# Patient Record
Sex: FEMALE | Race: WHITE | Employment: UNEMPLOYED | URBAN - METROPOLITAN AREA
[De-identification: names, ages, dates, MRNs, and addresses within clinical notes are randomized per-mention and may not be internally consistent; named-entity substitution may affect disease eponyms.]

---

## 2017-05-16 ENCOUNTER — OFFICE VISIT (OUTPATIENT)
Dept: INTERNAL MEDICINE CLINIC | Age: 22
End: 2017-05-16

## 2017-05-16 VITALS
TEMPERATURE: 98.9 F | RESPIRATION RATE: 16 BRPM | OXYGEN SATURATION: 98 % | DIASTOLIC BLOOD PRESSURE: 83 MMHG | HEART RATE: 75 BPM | BODY MASS INDEX: 23.74 KG/M2 | HEIGHT: 63 IN | WEIGHT: 134 LBS | SYSTOLIC BLOOD PRESSURE: 129 MMHG

## 2017-05-16 DIAGNOSIS — F32.A ANXIETY AND DEPRESSION: Primary | ICD-10-CM

## 2017-05-16 DIAGNOSIS — R00.0 TACHYCARDIA: ICD-10-CM

## 2017-05-16 DIAGNOSIS — Z86.79 HISTORY OF WOLFF-PARKINSON-WHITE (WPW) SYNDROME: ICD-10-CM

## 2017-05-16 DIAGNOSIS — F41.9 ANXIETY AND DEPRESSION: Primary | ICD-10-CM

## 2017-05-16 DIAGNOSIS — Z23 ENCOUNTER FOR IMMUNIZATION: ICD-10-CM

## 2017-05-16 RX ORDER — ESCITALOPRAM OXALATE 5 MG/1
5 TABLET ORAL DAILY
Qty: 30 TAB | Refills: 0 | Status: SHIPPED | OUTPATIENT
Start: 2017-05-16 | End: 2017-06-06 | Stop reason: SDUPTHER

## 2017-05-16 RX ORDER — NORETHINDRONE ACETATE AND ETHINYL ESTRADIOL 1.5; 3 MG/1; UG/1
1 TABLET ORAL DAILY
Refills: 4 | COMMUNITY
Start: 2017-04-29 | End: 2017-08-16 | Stop reason: SINTOL

## 2017-05-16 NOTE — MR AVS SNAPSHOT
Visit Information Date & Time Provider Department Dept. Phone Encounter #  
 5/16/2017  1:30 PM Roshni Leon MD [facility] [address]  [id number] Upcoming Health Maintenance Date Due  
 HPV AGE 9Y-34Y (1 of 3 - Female 3 Dose Series) 4/26/2007 DTaP/Tdap/Td series (1 - Tdap) 4/26/2017 PAP AKA CERVICAL CYTOLOGY 4/26/2017 INFLUENZA AGE 9 TO ADULT 8/1/2017 Allergies as of 5/16/2017  Review Complete On: 5/16/2017 By: Roshni Leon MD  
 No Known Allergies Current Immunizations  Never Reviewed Name Date Tdap  Incomplete Not reviewed this visit You Were Diagnosed With   
  
 Codes Comments Anxiety and depression    -  Primary ICD-10-CM: F41.9, F32.9 ICD-9-CM: 300.00, 311 Encounter for immunization     ICD-10-CM: G69 ICD-9-CM: V03.89 Tachycardia     ICD-10-CM: R00.0 ICD-9-CM: 785.0 History of Adin-Parkinson-White (WPW) syndrome     ICD-10-CM: Z86.79 
ICD-9-CM: V12.59 Vitals BP Pulse Temp Resp Height(growth percentile) Weight(growth percentile) 129/83 (BP 1 Location: Left arm, BP Patient Position: Sitting) 75 98.9 °F (37.2 °C) (Oral) 16 5' 3\" (1.6 m) 134 lb (60.8 kg) LMP SpO2 BMI OB Status Smoking Status 05/16/2017 (Exact Date) 98% 23.74 kg/m2 Having regular periods Never Smoker Vitals History BMI and BSA Data Body Mass Index Body Surface Area  
 23.74 kg/m 2 1.64 m 2 Preferred Pharmacy Pharmacy Name Phone [facility] [address] [phone] Your Updated Medication List  
  
   
This list is accurate as of: 5/16/17  2:14 PM.  Always use your most recent med list.  
  
  
  
  
 escitalopram oxalate 5 mg tablet Commonly known as:  Maudie Phoenix Take 1 Tab by mouth daily. Leatha Zheng 1.5/30 (21) 1.5-30 mg-mcg Tab Generic drug:  norethindrone ac-eth estradiol Take 1 Tab by mouth daily. Prescriptions Printed Refills  
 escitalopram oxalate (LEXAPRO) 5 mg tablet 0 Sig: Take 1 Tab by mouth daily. Class: Print Route: Oral  
  
We Performed the Following CBC WITH AUTOMATED DIFF [17126 CPT(R)] METABOLIC PANEL, COMPREHENSIVE [81325 CPT(R)] REFERRAL TO CARDIOLOGY [WHB85 Custom] Comments:  
 Please evaluate patient for svt and wpw history. T4, FREE R911363 CPT(R)] TETANUS, DIPHTHERIA TOXOIDS AND ACELLULAR PERTUSSIS VACCINE (TDAP), IN INDIVIDS. >=7, IM [id number] CPT(R)] TSH 3RD GENERATION [24252 CPT(R)] Referral Information Referral ID Referred By Referred To  
  
 [id number] [facility] REGIS HERNANDEZ [facility], Magda Galvan MD   
   [place] [address] [address] 1400 [facility], [address] [place] [address] Avenue Phone: [phone] Fax: [phone] Visits Status Start Date End Date 1 New Request 5/16/17 5/16/18 If your referral has a status of pending review or denied, additional information will be sent to support the outcome of this decision. Introducing [facility] & HEALTH SERVICES! Erica Safiaeugenio introduces [employer] patient portal. Now you can access parts of your medical record, email your doctor's office, and request medication refills online. 1. In your internet browser, go to [email or web address] 2. Click on the First Time User? Click Here link in the Sign In box. You will see the New Member Sign Up page. 3. Enter your [employer] Access Code exactly as it appears below. You will not need to use this code after youve completed the sign-up process. If you do not sign up before the expiration date, you must request a new code. · [employer] Access Code: [email or web address] Expires: 8/14/2017  1:28 PM 
 
4. Enter the last four digits of your Social Security Number (xxxx) and Date of Birth (mm/dd/yyyy) as indicated and click Submit. You will be taken to the next sign-up page. 5. Create a [employer] ID.  This will be your [employer] login ID and cannot be changed, so think of one that is secure and easy to remember. 6. Create a [employer] password. You can change your password at any time. 7. Enter your Password Reset Question and Answer. This can be used at a later time if you forget your password. 8. Enter your e-mail address. You will receive e-mail notification when new information is available in [address] 9. Click Sign Up. You can now view and download portions of your medical record. 10. Click the Download Summary menu link to download a portable copy of your medical information. If you have questions, please visit the Frequently Asked Questions section of the [employer] website. Remember, [employer] is NOT to be used for urgent needs. For medical emergencies, dial 911. Now available from your iPhone and Android! Please provide this summary of care documentation to your next provider. Your primary care clinician is listed as Chanel 68. If you have any questions after today's visit, please call [phone].

## 2017-05-16 NOTE — PROGRESS NOTES
HISTORY OF PRESENT ILLNESS  Shaq Palomares is a 25 y.o. female. HPI  New to me and this practice. I know her mom. Comes in to get established. 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 She will be going this summer to [facility] for nine weeks and then either [place] or [place] for five weeks. Issues:  1. History of catheter ablation 2010 and 2012 for WPW. Previously saw Dr. Celeste Vila. Currently does not have a cardiologist.  Notes that when she exercises, running for about a mile, her heart rate gets very fast up around 200 and she can feel lightheaded. She's not had any syncopal spells or chest pain. 2. Mood. She describes herself as having anxiety/depression for years ever since parents got  in middle school. She has worked with a therapist.  She has never been on meds. She is interested in trying medication, noting she has found herself feeling very dark and down and although she did go to school and complete classes, there were days when she did little else. She's not had any suicidal plans or thoughts, although has felt she might wish to not be here anymore. She had some panic attacks, but none recently. She denies substance abuse. She denies insomnia. 3. Upcoming travel. Discussed Malarone and Cipro for travel and she'll check on any other shots required. Immunization record brought in and last tetanus was in 2007. She's had two Hep A, three Hep B and two Gardasil shots. She was given an order for the third Gardasil. She's had two MMR shots and is up to date on polio. Review of Systems   Cardiovascular: Positive for palpitations. Negative for chest pain. Musculoskeletal: Negative for falls. Neurological: Positive for dizziness. Negative for loss of consciousness. Psychiatric/Behavioral: Positive for depression. Negative for substance abuse and suicidal ideas. The patient is nervous/anxious. The patient does not have insomnia.     All other systems reviewed and are negative. Physical Exam   Constitutional: She is oriented to person, place, and time. She appears well-developed and well-nourished. HENT:   Head: Normocephalic and atraumatic. Right Ear: External ear normal.   Left Ear: External ear normal.   Mouth/Throat: Oropharynx is clear and moist.   Eyes: Conjunctivae are normal. Pupils are equal, round, and reactive to light. Neck: Normal range of motion. Neck supple. Carotid bruit is not present. No thyromegaly present. Cardiovascular: Normal rate, regular rhythm, S1 normal, S2 normal, normal heart sounds and intact distal pulses. No murmur heard. Pulmonary/Chest: Effort normal and breath sounds normal. No respiratory distress. She has no wheezes. She has no rales. Abdominal: Bowel sounds are normal. There is no rebound. Musculoskeletal: She exhibits no edema. Lymphadenopathy:     She has no cervical adenopathy. Neurological: She is alert and oriented to person, place, and time. Skin: No rash noted. Psychiatric: She has a normal mood and affect. Her behavior is normal.   Nursing note and vitals reviewed. SHANTEL and Remberto Murguia was seen today for establish care and immunization/injection. Diagnoses and all orders for this visit:    Anxiety and depression  -     escitalopram oxalate (LEXAPRO) 5 mg tablet; Take 1 Tab by mouth daily.   Refer to therapist and appt in 3 weeks    Encounter for immunization  -     Tetanus, diphtheria toxoids and acellular pertussis (TDAP) vaccine, in individuals >=7 years, IM    Tachycardia-with sxs refer to cardiology  -     METABOLIC PANEL, COMPREHENSIVE  -     CBC WITH AUTOMATED DIFF  -     TSH 3RD GENERATION  -     T4, FREE    History of Adin-Parkinson-White (WPW) syndrome  -     REFERRAL TO CARDIOLOGY

## 2017-05-17 LAB
ALBUMIN SERPL-MCNC: 4.3 G/DL (ref 3.5–5.5)
ALBUMIN/GLOB SERPL: 1.5 {RATIO} (ref 1.2–2.2)
ALP SERPL-CCNC: 61 IU/L (ref 39–117)
ALT SERPL-CCNC: 22 IU/L (ref 0–32)
AST SERPL-CCNC: 28 IU/L (ref 0–40)
BASOPHILS # BLD AUTO: 0 X10E3/UL (ref 0–0.2)
BASOPHILS NFR BLD AUTO: 1 %
BILIRUB SERPL-MCNC: 0.8 MG/DL (ref 0–1.2)
BUN SERPL-MCNC: 7 MG/DL (ref 6–20)
BUN/CREAT SERPL: 11 (ref 9–23)
CALCIUM SERPL-MCNC: 9.2 MG/DL (ref 8.7–10.2)
CHLORIDE SERPL-SCNC: 99 MMOL/L (ref 96–106)
CO2 SERPL-SCNC: 23 MMOL/L (ref 18–29)
CREAT SERPL-MCNC: 0.64 MG/DL (ref 0.57–1)
EOSINOPHIL # BLD AUTO: 0.3 X10E3/UL (ref 0–0.4)
EOSINOPHIL NFR BLD AUTO: 4 %
ERYTHROCYTE [DISTWIDTH] IN BLOOD BY AUTOMATED COUNT: 13.9 % (ref 12.3–15.4)
GLOBULIN SER CALC-MCNC: 2.9 G/DL (ref 1.5–4.5)
GLUCOSE SERPL-MCNC: 84 MG/DL (ref 65–99)
HCT VFR BLD AUTO: 43.3 % (ref 34–46.6)
HGB BLD-MCNC: 15.1 G/DL (ref 11.1–15.9)
IMM GRANULOCYTES # BLD: 0 X10E3/UL (ref 0–0.1)
IMM GRANULOCYTES NFR BLD: 1 %
LYMPHOCYTES # BLD AUTO: 3.2 X10E3/UL (ref 0.7–3.1)
LYMPHOCYTES NFR BLD AUTO: 42 %
MCH RBC QN AUTO: 31.8 PG (ref 26.6–33)
MCHC RBC AUTO-ENTMCNC: 34.9 G/DL (ref 31.5–35.7)
MCV RBC AUTO: 91 FL (ref 79–97)
MONOCYTES # BLD AUTO: 0.7 X10E3/UL (ref 0.1–0.9)
MONOCYTES NFR BLD AUTO: 9 %
NEUTROPHILS # BLD AUTO: 3.5 X10E3/UL (ref 1.4–7)
NEUTROPHILS NFR BLD AUTO: 43 %
PLATELET # BLD AUTO: 343 X10E3/UL (ref 150–379)
POTASSIUM SERPL-SCNC: 4.1 MMOL/L (ref 3.5–5.2)
PROT SERPL-MCNC: 7.2 G/DL (ref 6–8.5)
RBC # BLD AUTO: 4.75 X10E6/UL (ref 3.77–5.28)
SODIUM SERPL-SCNC: 140 MMOL/L (ref 134–144)
T4 FREE SERPL-MCNC: 1.25 NG/DL (ref 0.82–1.77)
TSH SERPL DL<=0.005 MIU/L-ACNC: 0.94 UIU/ML (ref 0.45–4.5)
WBC # BLD AUTO: 7.7 X10E3/UL (ref 3.4–10.8)

## 2017-06-06 ENCOUNTER — OFFICE VISIT (OUTPATIENT)
Dept: INTERNAL MEDICINE CLINIC | Age: 22
End: 2017-06-06

## 2017-06-06 VITALS
BODY MASS INDEX: 24.27 KG/M2 | HEART RATE: 96 BPM | WEIGHT: 137 LBS | SYSTOLIC BLOOD PRESSURE: 132 MMHG | DIASTOLIC BLOOD PRESSURE: 85 MMHG | TEMPERATURE: 99 F | HEIGHT: 63 IN | OXYGEN SATURATION: 98 % | RESPIRATION RATE: 16 BRPM

## 2017-06-06 DIAGNOSIS — F41.9 ANXIETY AND DEPRESSION: Primary | ICD-10-CM

## 2017-06-06 DIAGNOSIS — F32.A ANXIETY AND DEPRESSION: Primary | ICD-10-CM

## 2017-06-06 RX ORDER — ESCITALOPRAM OXALATE 5 MG/1
TABLET ORAL
Qty: 45 TAB | Refills: 4 | Status: SHIPPED | OUTPATIENT
Start: 2017-06-06 | End: 2017-08-16 | Stop reason: DRUGHIGH

## 2017-06-06 NOTE — PROGRESS NOTES
HISTORY OF PRESENT ILLNESS  Chioma Koch is a 25 y.o. female. HPI  Three week follow up, doing better on Lexapro 5. She's had no very dark episodes and no panic. She is working with a  class now and quite busy with her work, exhausted at night. Sleeps well. No suicidal thoughts. Review of Systems   Gastrointestinal: Negative for abdominal pain and diarrhea. Psychiatric/Behavioral: Negative for depression and suicidal ideas. The patient is not nervous/anxious and does not have insomnia. Physical Exam   Constitutional: She is oriented to person, place, and time. She appears well-developed and well-nourished. Neurological: She is alert and oriented to person, place, and time. Psychiatric: She has a normal mood and affect. Her behavior is normal. Judgment and thought content normal.   Nursing note and vitals reviewed. ASSESSMENT and Jp Hernandez was seen today for medication evaluation and anxiety. Diagnoses and all orders for this visit:    Anxiety and depression  -     escitalopram oxalate (LEXAPRO) 5 mg tablet; 1.5 tab qd      the following changes in treatment are made: inc from 5 to 7.5 mg dose as she reports 40% improvement on the 5 mg dose and goal is 75% or better  appt in end of august prior to travel overseas  Over 50% of the 15 minutes face to face with Chioma Koch consisted of counseling and/or discussing treatment plans in reference to her meds.

## 2017-06-06 NOTE — MR AVS SNAPSHOT
Visit Information Date & Time Provider Department Dept. Phone Encounter #  
 6/6/2017  1:30 PM Umang Rubin MD [facility] [address]  [id number] Your Appointments 6/26/2017  1:40 PM  
New Patient with Renny Bailey MD  
[facility] ([address]) Appt Note: appt marilyn'd by pt per Adam Guzman MD for establish adult cardiologist cp $50 kmr; 5/30/17 pts mother cancelled & her daughter will cb to rs kmr; appt marilyn'd by pt per Adam Guzman MD for establish adult cardiologist cp $50 kmr r/s from 05/31 to 06/26  
 [address] 350 [facility] [address] [facility] 7 [address] Upcoming Health Maintenance Date Due  
 HPV AGE 9Y-34Y (3 of 3 - Female 3 Dose Series) 10/2/2014 PAP AKA CERVICAL CYTOLOGY 4/26/2017 INFLUENZA AGE 9 TO ADULT 8/1/2017 DTaP/Tdap/Td series (8 - Td) 5/16/2027 Allergies as of 6/6/2017  Review Complete On: 6/6/2017 By: Kaylin Patel LPN No Known Allergies Current Immunizations  Reviewed on 6/6/2017 Name Date DTaP 3/15/2001, 10/28/1997, 1996, 1996, 1996 HPV 6/2/2014, 7/14/2012 Hep A Vaccine 7/14/2012, 6/28/2007 Hep B Vaccine 1/17/1997, 1996, 1996 Hib 1996, 1996, 1996 IPV 3/15/2001, 1996, 1996, 1996 MMR 8/15/2001, 7/29/1997 Meningococcal Vaccine 6/2/2014, 6/28/2007 Tdap 5/16/2017, 6/28/2007 Varicella Virus Vaccine 10/1/2008, 4/29/1997 Reviewed by Umang Rubin MD on 6/6/2017 at  1:49 PM  
You Were Diagnosed With   
  
 Codes Comments Anxiety and depression    -  Primary ICD-10-CM: F41.9, F32.9 ICD-9-CM: 300.00, 311 Vitals BP Pulse Temp Resp Height(growth percentile) Weight(growth percentile)  132/85 (BP 1 Location: Left arm, BP Patient Position: Sitting) 96 99 °F (37.2 °C) (Oral) 16 5' 3\" (1.6 m) 137 lb (62.1 kg) LMP SpO2 BMI OB Status Smoking Status 2017 (Exact Date) 98% 24.27 kg/m2 Having regular periods Never Smoker Vitals History BMI and BSA Data Body Mass Index Body Surface Area  
 24.27 kg/m 2 1.66 m 2 Preferred Pharmacy Pharmacy Name Phone [facility] [address] [phone] Your Updated Medication List  
  
   
This list is accurate as of: 17  2:05 PM.  Always use your most recent med list.  
  
  
  
  
 escitalopram oxalate 5 mg tablet Commonly known as:  LEXAPRO  
1.5 tab qd  
  
 ibuprofen 600 mg tablet Commonly known as:  MOTRIN Take 1 Tab by mouth every six (6) hours as needed for Pain. Laina Carrow . (21) 1.5-30 mg-mcg Tab Generic drug:  norethindrone ac-eth estradiol Take 1 Tab by mouth daily. OTHER Birth control pill daily Prescriptions Printed Refills  
 escitalopram oxalate (LEXAPRO) 5 mg tablet 4 Si.5 tab qd Class: Print Introducing [facility] & HEALTH SERVICES! [employer] introduces [employer] patient portal. Now you can access parts of your medical record, email your doctor's office, and request medication refills online. 1. In your internet browser, go to [email or web address] 2. Click on the First Time User? Click Here link in the Sign In box. You will see the New Member Sign Up page. 3. Enter your [employer] Access Code exactly as it appears below. You will not need to use this code after youve completed the sign-up process. If you do not sign up before the expiration date, you must request a new code. · [employer] Access Code: [email or web address] Expires: 2017  1:28 PM 
 
4. Enter the last four digits of your Social Security Number (xxxx) and Date of Birth (mm/dd/yyyy) as indicated and click Submit. You will be taken to the next sign-up page. 5. Create a [employer] ID. This will be your [employer] login ID and cannot be changed, so think of one that is secure and easy to remember. 6. Create a [employer] password. You can change your password at any time. 7. Enter your Password Reset Question and Answer. This can be used at a later time if you forget your password. 8. Enter your e-mail address. You will receive e-mail notification when new information is available in [address] 9. Click Sign Up. You can now view and download portions of your medical record. 10. Click the Download Summary menu link to download a portable copy of your medical information. If you have questions, please visit the Frequently Asked Questions section of the [employer] website. Remember, [employer] is NOT to be used for urgent needs. For medical emergencies, dial 911. Now available from your iPhone and Android! Please provide this summary of care documentation to your next provider. Your primary care clinician is listed as Chanel Singer. If you have any questions after today's visit, please call [phone].

## 2017-07-31 ENCOUNTER — TELEPHONE (OUTPATIENT)
Dept: INTERNAL MEDICINE CLINIC | Age: 22
End: 2017-07-31

## 2017-07-31 NOTE — TELEPHONE ENCOUNTER
Pt has an appt 8/16/17 for shots for going out of the country, she knows what she needs and just wants to make sure that we have them, please call to advise [phone].

## 2017-07-31 NOTE — TELEPHONE ENCOUNTER
Patient states she is traveling to [place] in Sept & she will need the Typhoid vaccine, yellow fever, Malaria pills for prevention. I advised we do not carry these in office but we can give orders that she can take to a travel pharmacy to receive the vaccines. Patient voiced understanding.

## 2017-08-16 ENCOUNTER — OFFICE VISIT (OUTPATIENT)
Dept: INTERNAL MEDICINE CLINIC | Age: 22
End: 2017-08-16

## 2017-08-16 ENCOUNTER — TELEPHONE (OUTPATIENT)
Dept: INTERNAL MEDICINE CLINIC | Age: 22
End: 2017-08-16

## 2017-08-16 VITALS
RESPIRATION RATE: 16 BRPM | WEIGHT: 142 LBS | SYSTOLIC BLOOD PRESSURE: 109 MMHG | HEART RATE: 85 BPM | DIASTOLIC BLOOD PRESSURE: 72 MMHG | BODY MASS INDEX: 25.16 KG/M2 | OXYGEN SATURATION: 96 % | TEMPERATURE: 98.6 F | HEIGHT: 63 IN

## 2017-08-16 DIAGNOSIS — F41.9 ANXIETY AND DEPRESSION: Primary | ICD-10-CM

## 2017-08-16 DIAGNOSIS — F32.A ANXIETY AND DEPRESSION: Primary | ICD-10-CM

## 2017-08-16 RX ORDER — ESCITALOPRAM OXALATE 10 MG/1
10 TABLET ORAL DAILY
Qty: 30 TAB | Refills: 4 | Status: SHIPPED | OUTPATIENT
Start: 2017-08-16 | End: 2017-08-16 | Stop reason: SDUPTHER

## 2017-08-16 RX ORDER — ATOVAQUONE AND PROGUANIL HYDROCHLORIDE 250; 100 MG/1; MG/1
1 TABLET, FILM COATED ORAL DAILY
Qty: 14 TAB | Refills: 0 | Status: SHIPPED | OUTPATIENT
Start: 2017-08-16 | End: 2018-01-18 | Stop reason: ALTCHOICE

## 2017-08-16 RX ORDER — ESCITALOPRAM OXALATE 10 MG/1
10 TABLET ORAL DAILY
Qty: 90 TAB | Refills: 1 | Status: SHIPPED | OUTPATIENT
Start: 2017-08-16 | End: 2019-01-30 | Stop reason: SDUPTHER

## 2017-08-16 RX ORDER — CIPROFLOXACIN 500 MG/1
500 TABLET ORAL 2 TIMES DAILY
Qty: 14 TAB | Refills: 0 | Status: SHIPPED | OUTPATIENT
Start: 2017-08-16 | End: 2018-01-18 | Stop reason: ALTCHOICE

## 2017-08-16 NOTE — PROGRESS NOTES
HISTORY OF PRESENT ILLNESS  Lloyd Pabon is a 25 y.o. female. HPI   Seen at follow up. She's on 7.5 of Lexapro, which she tolerates. No dry mouth, no GI upset, no libido changes. Notes she's not had any panic episodes. She had some sadness over the loss of a friend, but otherwise has felt able to work and doing her job at  well. Looking forward to travel to [facility]. No dark thoughts. Some trouble cutting the pill in half. We are going to bump to the 10 mg. Review of Systems   Constitutional: Negative for malaise/fatigue. Gastrointestinal: Negative for diarrhea. Neurological: Negative for dizziness and tremors. Psychiatric/Behavioral: Negative for depression and suicidal ideas. The patient is not nervous/anxious and does not have insomnia. Physical Exam   Constitutional: She is oriented to person, place, and time. She appears well-developed and well-nourished. Neurological: She is alert and oriented to person, place, and time. Psychiatric: She has a normal mood and affect. Her behavior is normal. Judgment and thought content normal.   Nursing note and vitals reviewed. ASSESSMENT and PLAN  Diagnoses and all orders for this visit:    1. Anxiety and depression  -     escitalopram oxalate (LEXAPRO) 10 mg tablet; Take 1 Tab by mouth daily. the following changes in treatment are made: cahgne from 7.5 to 10 mg   Over 50% of the 15 minutes face to face with Lloyd Pabon consisted of counseling and/or discussing treatment plans in reference to her meds and side effects to monitor for and avoidance of etoh with travel.

## 2017-08-16 NOTE — TELEPHONE ENCOUNTER
Ok I sent malarone and cipro for travelers diarrhea to use prn severe diarrhea  Sent to Iliana norman

## 2017-08-31 ENCOUNTER — OFFICE VISIT (OUTPATIENT)
Dept: CARDIOLOGY CLINIC | Age: 22
End: 2017-08-31

## 2017-08-31 ENCOUNTER — CLINICAL SUPPORT (OUTPATIENT)
Dept: CARDIOLOGY CLINIC | Age: 22
End: 2017-08-31

## 2017-08-31 VITALS
BODY MASS INDEX: 25.73 KG/M2 | HEIGHT: 63 IN | HEART RATE: 82 BPM | RESPIRATION RATE: 16 BRPM | DIASTOLIC BLOOD PRESSURE: 84 MMHG | WEIGHT: 145.2 LBS | SYSTOLIC BLOOD PRESSURE: 140 MMHG

## 2017-08-31 DIAGNOSIS — Z86.79 HISTORY OF WOLFF-PARKINSON-WHITE (WPW) SYNDROME: ICD-10-CM

## 2017-08-31 DIAGNOSIS — Z86.79 HISTORY OF WOLFF-PARKINSON-WHITE (WPW) SYNDROME: Primary | ICD-10-CM

## 2017-08-31 DIAGNOSIS — R94.31 ABNORMAL EKG: Primary | ICD-10-CM

## 2017-08-31 NOTE — PROGRESS NOTES
[place]: [facility]  [phone]  Requesting/referring provider: Dr. Kin Evans  Reason for Consult: SVT    HPI: Elsie Hesterist, a 25y.o. year-old who presents for evaluation of SVT/palptiations. She feels well now and has had no major recurrence since her SVT ablation in 2012. Had a prior ablation in 2010 but it was not effective. No activity limitations. She has noted some exertional dyspnea/dizziness/fatigue with walking on the treadmill that lasts a few minutes after she stops exercising. She was running 2 miles a day but recently stopped. She leaves tomorrow to study [employer] in [facility] and [place]. Had one episode of syncope at [facility] prior to ablation, no sx recently. Assessment/Plan:  1. WPW- s/p ablation 2010 and 2012  -last seen by Dr. Prudence Desai 2012, records requested  2. Anxiety/depression- on lexapro  3. Palpitations- mild, reviewed when to worry and call    Soc no tob rare etoh  Fhx no early CAD  She  has a past medical history of Heart abnormalities. Cardiovascular ROS: no chest pain or dyspnea on exertion  Respiratory ROS: no cough, shortness of breath, or wheezing  Neurological ROS: no TIA or stroke symptoms  All other systems negative except as above. PE  Vitals:    08/31/17 0845   BP: 140/84   Pulse: 82   Resp: 16   Weight: 145 lb 3.2 oz (65.9 kg)   Height: 5' 3\" (1.6 m)    Body mass index is 25.72 kg/(m^2).    General appearance - alert, well appearing, and in no distress  Mental status - affect appropriate to mood  Eyes - sclera anicteric, moist mucous membranes  Neck - supple, no significant adenopathy  Lymphatics - no  lymphadenopathy  Chest - clear to auscultation, no wheezes, rales or rhonchi  Heart - normal rate, regular rhythm, normal S1, S2, no murmurs, rubs, clicks or gallops  Abdomen - soft, nontender, nondistended, no masses or organomegaly  Back exam - full range of motion, no tenderness  Neurological - cranial nerves II through XII grossly intact, no focal deficit  Musculoskeletal - no muscular tenderness noted, normal strength  Extremities - peripheral pulses normal, no pedal edema  Skin - normal coloration  no rashes    Recent Labs:  No results found for: CHOL, CHOLX, CHLST, CHOLV, 079757, HDL, LDL, LDLC, DLDLP, TGLX, TRIGL, TRIGP, CHHD, CHHDX  Lab Results   Component Value Date/Time    Creatinine 0.64 05/16/2017 02:33 PM     Lab Results   Component Value Date/Time    BUN 7 05/16/2017 02:33 PM     Lab Results   Component Value Date/Time    Potassium 4.1 05/16/2017 02:33 PM     No results found for: HBA1C, HGBE8, ERH5YZAT  Lab Results   Component Value Date/Time    HGB 15.1 05/16/2017 02:33 PM     Lab Results   Component Value Date/Time    PLATELET 638 12/81/7379 02:33 PM       Reviewed:  Past Medical History:   Diagnosis Date    Heart abnormalities     WPW  SVT     History   Smoking Status    Never Smoker   Smokeless Tobacco    Never Used     History   Alcohol Use    0.6 oz/week    1 Cans of beer per week     Comment: occasional     No Known Allergies    Current Outpatient Prescriptions   Medication Sig    escitalopram oxalate (LEXAPRO) 10 mg tablet Take 1 Tab by mouth daily.  atovaquone-proguanil (MALARONE) 250-100 mg per tablet Take 1 Tab by mouth daily.  ciprofloxacin HCl (CIPRO) 500 mg tablet Take 1 Tab by mouth two (2) times a day.  OTHER Birth control pill daily    ibuprofen (MOTRIN) 600 mg tablet Take 1 Tab by mouth every six (6) hours as needed for Pain. No current facility-administered medications for this visit.         Mel Horner MD  [facility] [place]  [address] [facility] [address]  [facility], [address]

## 2018-01-18 ENCOUNTER — OFFICE VISIT (OUTPATIENT)
Dept: INTERNAL MEDICINE CLINIC | Age: 23
End: 2018-01-18

## 2018-01-18 VITALS
RESPIRATION RATE: 16 BRPM | HEIGHT: 63 IN | OXYGEN SATURATION: 98 % | DIASTOLIC BLOOD PRESSURE: 78 MMHG | WEIGHT: 150 LBS | TEMPERATURE: 98.7 F | BODY MASS INDEX: 26.58 KG/M2 | HEART RATE: 81 BPM | SYSTOLIC BLOOD PRESSURE: 116 MMHG

## 2018-01-18 DIAGNOSIS — R09.81 HEAD CONGESTION: ICD-10-CM

## 2018-01-18 DIAGNOSIS — R11.11 NON-INTRACTABLE VOMITING WITHOUT NAUSEA, UNSPECIFIED VOMITING TYPE: Primary | ICD-10-CM

## 2018-01-18 DIAGNOSIS — J11.1 INFLUENZA: ICD-10-CM

## 2018-01-18 DIAGNOSIS — R31.29 MICROSCOPIC HEMATURIA: ICD-10-CM

## 2018-01-18 LAB
QUICKVUE INFLUENZA TEST: POSITIVE
VALID INTERNAL CONTROL?: YES

## 2018-01-18 RX ORDER — OSELTAMIVIR PHOSPHATE 75 MG/1
75 CAPSULE ORAL 2 TIMES DAILY
Qty: 10 CAP | Refills: 0 | Status: SHIPPED | OUTPATIENT
Start: 2018-01-18 | End: 2018-01-23

## 2018-01-18 NOTE — PROGRESS NOTES
HISTORY OF PRESENT ILLNESS  Guero Gr is a 25 y.o. female. WILL Mensah was sick last week with vomiting about 24 hours ago. She's not had fevers, chills, abdominal pain or diarrhea. She went to [facility], had white cells and red cells in her urine and was put on Cipro for presumed UTI. Apparently her sodium was low. She knows she has been exposed to a GI bug at the  where she works. She had xrays of her abdomen, which were negative. Her vomiting has resolved. She's back to being able to eat quesadillas and denies abdominal symptoms. As of 48 hours she's had a sore throat, some congestion and mild fatigue. No high fevers or chills. Review of Systems   Constitutional: Positive for malaise/fatigue. Negative for chills, fever and weight loss. HENT: Positive for congestion and sore throat. Respiratory: Negative for cough, shortness of breath and wheezing. Cardiovascular: Negative for chest pain, palpitations, orthopnea, leg swelling and PND. Gastrointestinal: Negative for abdominal pain, diarrhea, heartburn, nausea and vomiting. Musculoskeletal: Negative for myalgias. Neurological: Negative for dizziness and headaches. Physical Exam   Constitutional: She is oriented to person, place, and time. She appears well-developed and well-nourished. HENT:   Head: Normocephalic. Right Ear: Tympanic membrane, external ear and ear canal normal.   Left Ear: Tympanic membrane, external ear and ear canal normal.   Nose: Nose normal.   Mouth/Throat: Oropharynx is clear and moist and mucous membranes are normal. No oropharyngeal exudate. Eyes: Conjunctivae are normal. Pupils are equal, round, and reactive to light. Right eye exhibits no discharge. Left eye exhibits no discharge. Neck: Normal range of motion. Neck supple. Cardiovascular: Normal rate, regular rhythm and normal heart sounds. Pulmonary/Chest: Effort normal and breath sounds normal. No respiratory distress.  She has no wheezes. She has no rales. Abdominal: Soft. Bowel sounds are normal. She exhibits no distension. There is no tenderness. Lymphadenopathy:     She has no cervical adenopathy. Neurological: She is alert and oriented to person, place, and time. Skin: No rash noted. Nursing note and vitals reviewed. ASSESSMENT and PLAN  Diagnoses and all orders for this visit:    1. Non-intractable vomiting without nausea, unspecified vomiting type-resolved since last week and not recurring   Stop cipro given at [facility]  -     METABOLIC PANEL, COMPREHENSIVE    2. Head congestion  -     AMB POC RAPID INFLUENZA TEST-positive  oow for 72 hours and use tylenol prn    3. Microscopic hematuria  -     URINALYSIS W/ RFLX MICROSCOPIC  -     CULTURE, URINE    4. Influenza  -     oseltamivir (TAMIFLU) 75 mg capsule; Take 1 Cap by mouth two (2) times a day for 5 days.

## 2018-01-19 LAB
ALBUMIN SERPL-MCNC: 3.9 G/DL (ref 3.5–5.5)
ALBUMIN/GLOB SERPL: 1.6 {RATIO} (ref 1.2–2.2)
ALP SERPL-CCNC: 73 IU/L (ref 39–117)
ALT SERPL-CCNC: 38 IU/L (ref 0–32)
APPEARANCE UR: CLEAR
AST SERPL-CCNC: 34 IU/L (ref 0–40)
BACTERIA #/AREA URNS HPF: ABNORMAL /[HPF]
BACTERIA UR CULT: NO GROWTH
BILIRUB SERPL-MCNC: 1.1 MG/DL (ref 0–1.2)
BILIRUB UR QL STRIP: NEGATIVE
BUN SERPL-MCNC: 4 MG/DL (ref 6–20)
BUN/CREAT SERPL: 7 (ref 9–23)
CALCIUM SERPL-MCNC: 8.8 MG/DL (ref 8.7–10.2)
CASTS URNS QL MICRO: ABNORMAL /LPF
CHLORIDE SERPL-SCNC: 104 MMOL/L (ref 96–106)
CO2 SERPL-SCNC: 24 MMOL/L (ref 18–29)
COLOR UR: YELLOW
CREAT SERPL-MCNC: 0.59 MG/DL (ref 0.57–1)
EPI CELLS #/AREA URNS HPF: ABNORMAL /HPF
GLOBULIN SER CALC-MCNC: 2.5 G/DL (ref 1.5–4.5)
GLUCOSE SERPL-MCNC: 77 MG/DL (ref 65–99)
GLUCOSE UR QL: NEGATIVE
HGB UR QL STRIP: ABNORMAL
KETONES UR QL STRIP: NEGATIVE
LEUKOCYTE ESTERASE UR QL STRIP: NEGATIVE
MICRO URNS: ABNORMAL
MUCOUS THREADS URNS QL MICRO: PRESENT
NITRITE UR QL STRIP: NEGATIVE
PH UR STRIP: 6.5 [PH] (ref 5–7.5)
POTASSIUM SERPL-SCNC: 4 MMOL/L (ref 3.5–5.2)
PROT SERPL-MCNC: 6.4 G/DL (ref 6–8.5)
PROT UR QL STRIP: NEGATIVE
RBC #/AREA URNS HPF: ABNORMAL /HPF
SODIUM SERPL-SCNC: 141 MMOL/L (ref 134–144)
SP GR UR: 1.01 (ref 1–1.03)
UROBILINOGEN UR STRIP-MCNC: 0.2 MG/DL (ref 0.2–1)
WBC #/AREA URNS HPF: ABNORMAL /HPF

## 2019-01-29 DIAGNOSIS — F41.9 ANXIETY AND DEPRESSION: ICD-10-CM

## 2019-01-29 DIAGNOSIS — F32.A ANXIETY AND DEPRESSION: ICD-10-CM

## 2019-01-29 NOTE — TELEPHONE ENCOUNTER
----- Message from Bonny Jones sent at 1/29/2019  5:05 PM EST -----  Regarding: Dr. Marce Downs  Pt is requesting a refill for Rx Lexapro 10mg at [facility] [phone]). Best contact is [phone].

## 2019-01-30 ENCOUNTER — OFFICE VISIT (OUTPATIENT)
Dept: INTERNAL MEDICINE CLINIC | Age: 24
End: 2019-01-30

## 2019-01-30 VITALS
WEIGHT: 161 LBS | TEMPERATURE: 99.2 F | DIASTOLIC BLOOD PRESSURE: 83 MMHG | BODY MASS INDEX: 28.53 KG/M2 | HEART RATE: 104 BPM | SYSTOLIC BLOOD PRESSURE: 140 MMHG | RESPIRATION RATE: 16 BRPM | HEIGHT: 63 IN | OXYGEN SATURATION: 98 %

## 2019-01-30 DIAGNOSIS — F32.A ANXIETY AND DEPRESSION: ICD-10-CM

## 2019-01-30 DIAGNOSIS — F41.9 ANXIETY AND DEPRESSION: ICD-10-CM

## 2019-01-30 RX ORDER — ESCITALOPRAM OXALATE 10 MG/1
10 TABLET ORAL DAILY
Qty: 90 TAB | Refills: 1 | Status: SHIPPED | OUTPATIENT
Start: 2019-01-30 | End: 2019-03-27 | Stop reason: DRUGHIGH

## 2019-01-30 RX ORDER — ESCITALOPRAM OXALATE 10 MG/1
10 TABLET ORAL DAILY
Qty: 90 TAB | Refills: 1 | OUTPATIENT
Start: 2019-01-30

## 2019-01-30 NOTE — PROGRESS NOTES
HISTORY OF PRESENT ILLNESS  Dwayne Guerrero is a 26 y.o. female. HPI  Juna F Juarez comes in to discuss resuming medication. She was in [facility] and then [place], [place] of [place]. 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  She has been off Lexapro since last spring. Initially felt well, however recently having more depressive symptoms and episode of panic last week. Did not have side effects while on the medicine, has not had any substance issues. Would like to resume meds. Review of Systems   Constitutional: Negative for chills, fever and weight loss. Respiratory: Negative for cough, shortness of breath and wheezing. Cardiovascular: Negative for chest pain, palpitations, orthopnea, leg swelling and PND. Gastrointestinal: Negative for heartburn and nausea. Musculoskeletal: Negative for myalgias. Neurological: Negative for dizziness and headaches. Psychiatric/Behavioral: Positive for depression. Negative for substance abuse and suicidal ideas. The patient is nervous/anxious. The patient does not have insomnia. Physical Exam   Constitutional: She is oriented to person, place, and time. She appears well-developed and well-nourished. Neurological: She is alert and oriented to person, place, and time. Psychiatric: She has a normal mood and affect. Her behavior is normal. Judgment and thought content normal.   Nursing note and vitals reviewed. ASSESSMENT and PLAN  Diagnoses and all orders for this visit:    1. Anxiety and depression  -     escitalopram oxalate (LEXAPRO) 10 mg tablet; Take 1 Tab by mouth daily. Resume med  appt in march fasting with labs  Over 50% of the 15 minutes face to face with Dwayne Guerrero consisted of counseling and/or discussing treatment plans in reference to her meds.

## 2019-03-27 ENCOUNTER — OFFICE VISIT (OUTPATIENT)
Dept: INTERNAL MEDICINE CLINIC | Age: 24
End: 2019-03-27

## 2019-03-27 VITALS
WEIGHT: 164 LBS | HEART RATE: 82 BPM | OXYGEN SATURATION: 98 % | RESPIRATION RATE: 16 BRPM | DIASTOLIC BLOOD PRESSURE: 78 MMHG | SYSTOLIC BLOOD PRESSURE: 128 MMHG | HEIGHT: 63 IN | BODY MASS INDEX: 29.06 KG/M2 | TEMPERATURE: 97.9 F

## 2019-03-27 DIAGNOSIS — F41.9 ANXIETY AND DEPRESSION: Primary | ICD-10-CM

## 2019-03-27 DIAGNOSIS — F32.A ANXIETY AND DEPRESSION: Primary | ICD-10-CM

## 2019-03-27 RX ORDER — ESCITALOPRAM OXALATE 20 MG/1
20 TABLET ORAL DAILY
Qty: 30 TAB | Refills: 4 | Status: SHIPPED | OUTPATIENT
Start: 2019-03-27 | End: 2019-06-25 | Stop reason: SDUPTHER

## 2019-03-27 NOTE — PROGRESS NOTES
HISTORY OF PRESENT ILLNESS Vivian Casillas is a 26 y.o. female. HPI Two month follow up after resuming Lexapro 10 mg. She has not had any side effects. She has not had any panic episodes in the last two months. She still feels sadness and is tearful two to three times a week and feels lonely. She has been going to [facility] and notes she can get her heart rate up into the high 100s and feel lightheaded with this. I have asked her to limit the intensity for now. She is not having tachycardia when she is not at the gym. Review of Systems Constitutional: Negative for malaise/fatigue and weight loss. Psychiatric/Behavioral: Positive for depression. Negative for suicidal ideas. The patient is not nervous/anxious and does not have insomnia. Physical Exam  
Constitutional: She is oriented to person, place, and time. She appears well-developed and well-nourished. Neurological: She is alert and oriented to person, place, and time. Psychiatric: She has a normal mood and affect. Her behavior is normal. Judgment and thought content normal.  
Nursing note and vitals reviewed. ASSESSMENT and PLAN Diagnoses and all orders for this visit: 
 
1. Anxiety and depression 
-     escitalopram oxalate (LEXAPRO) 20 mg tablet; Take 1 Tab by mouth daily. Refer for therapy Cont exercise Social support discussed inc dose lexapro appt in 2 mo Over 50% of the 15 minutes face to face with Vivian Casillas consisted of counseling and/or discussing treatment plans in reference to her meds.

## 2019-06-25 ENCOUNTER — OFFICE VISIT (OUTPATIENT)
Dept: INTERNAL MEDICINE CLINIC | Age: 24
End: 2019-06-25

## 2019-06-25 VITALS
BODY MASS INDEX: 28.88 KG/M2 | TEMPERATURE: 98.6 F | WEIGHT: 163 LBS | DIASTOLIC BLOOD PRESSURE: 78 MMHG | RESPIRATION RATE: 16 BRPM | OXYGEN SATURATION: 98 % | HEIGHT: 63 IN | HEART RATE: 87 BPM | SYSTOLIC BLOOD PRESSURE: 134 MMHG

## 2019-06-25 DIAGNOSIS — F41.9 ANXIETY AND DEPRESSION: Primary | ICD-10-CM

## 2019-06-25 DIAGNOSIS — F32.A ANXIETY AND DEPRESSION: Primary | ICD-10-CM

## 2019-06-25 RX ORDER — ESCITALOPRAM OXALATE 20 MG/1
20 TABLET ORAL DAILY
Qty: 30 TAB | Refills: 4 | Status: SHIPPED | OUTPATIENT
Start: 2019-06-25 | End: 2019-09-05

## 2019-06-25 NOTE — PROGRESS NOTES
HISTORY OF PRESENT ILLNESS  Amina Vail is a 22 y.o. female. WILL Melchor is seen at follow up, on Lexapro 20. No perceived side effects and no dark thoughts or thoughts of harm. Anxiety controlled, however she has had in the last months episodes of feeling tearful and sad. 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  She is looking forward to having roommates and hopes to find new friends there. She is interested in talking with a therapist, has not yet pursued this, but sees the wisdom. She hates her job and would like to quit, but is working for another month. Review of Systems   Psychiatric/Behavioral: Positive for depression. Negative for memory loss, substance abuse and suicidal ideas. The patient is not nervous/anxious. Physical Exam   Constitutional: She is oriented to person, place, and time. She appears well-developed and well-nourished. Neurological: She is alert and oriented to person, place, and time. Psychiatric: She has a normal mood and affect. Her behavior is normal. Judgment and thought content normal.   Nursing note and vitals reviewed. ASSESSMENT and PLAN  Diagnoses and all orders for this visit:    1. Anxiety and depression  -     escitalopram oxalate (LEXAPRO) 20 mg tablet; Take 1 Tab by mouth daily.     Start therapy as she  Plans  Over 50% of the 15 minutes face to face with Amina Vail consisted of counseling and/or discussing treatment plans in reference to her sxs and meds  appt in 3mo  Starts at [employer] in august.

## 2019-07-12 ENCOUNTER — OFFICE VISIT (OUTPATIENT)
Dept: INTERNAL MEDICINE CLINIC | Age: 24
End: 2019-07-12

## 2019-07-12 VITALS
RESPIRATION RATE: 18 BRPM | HEART RATE: 88 BPM | TEMPERATURE: 98.9 F | WEIGHT: 162 LBS | DIASTOLIC BLOOD PRESSURE: 87 MMHG | HEIGHT: 63 IN | BODY MASS INDEX: 28.7 KG/M2 | OXYGEN SATURATION: 98 % | SYSTOLIC BLOOD PRESSURE: 127 MMHG

## 2019-07-12 DIAGNOSIS — R14.0 ABDOMINAL BLOATING: Primary | ICD-10-CM

## 2019-07-12 DIAGNOSIS — K58.2 IRRITABLE BOWEL SYNDROME WITH BOTH CONSTIPATION AND DIARRHEA: ICD-10-CM

## 2019-07-12 RX ORDER — DICYCLOMINE HYDROCHLORIDE 10 MG/1
10 CAPSULE ORAL 4 TIMES DAILY
Qty: 40 CAP | Refills: 0 | Status: SHIPPED | OUTPATIENT
Start: 2019-07-12 | End: 2021-11-01 | Stop reason: ALTCHOICE

## 2019-07-12 NOTE — PATIENT INSTRUCTIONS
Irritable Bowel Syndrome: Care Instructions Your Care Instructions Irritable bowel syndrome, or IBS, is a problem with the intestines that causes belly pain, bloating, gas, constipation, and diarrhea. The cause of IBS is not well known. IBS can last for many years, but it does not get worse over time or lead to serious disease. Most people can control their symptoms by changing their diet and reducing stress. Follow-up care is a key part of your treatment and safety. Be sure to make and go to all appointments, and call your doctor if you are having problems. It's also a good idea to know your test results and keep a list of the medicines you take. How can you care for yourself at home? · For constipation: 
? Include fruits, vegetables, beans, and whole grains in your diet each day. These foods are high in fiber. ? Drink plenty of fluids, enough so that your urine is light yellow or clear like water. If you have kidney, heart, or liver disease and have to limit fluids, talk with your doctor before you increase the amount of fluids you drink. ? Get some exercise every day. Build up slowly to 30 to 60 minutes a day on 5 or more days of the week. ? Take a fiber supplement, such as Citrucel or Metamucil, every day if needed. Read and follow all instructions on the label. ? Schedule time each day for a bowel movement. Having a daily routine may help. Take your time and do not strain when having a bowel movement. · If you often have diarrhea, limit foods and drinks that make it worse. These are different for each person but may include caffeine (found in coffee, tea, chocolate, and cola drinks), alcohol, fatty foods, gas-producing foods (such as beans, cabbage, and broccoli), some dairy products, and spicy foods. Do not eat candy or gum that contains sorbitol. · Keep a daily diary of what you eat and what symptoms you have. This may help find foods that cause you problems. · Eat slowly. Try to make mealtime relaxing. · Find ways to reduce stress. · Get at least 30 minutes of exercise on most days of the week. Exercise can help reduce tension and prevent constipation. Walking is a good choice. You also may want to do other activities, such as running, swimming, cycling, or playing tennis or team sports. When should you call for help? Call your doctor now or seek immediate medical care if: 
  · Your pain is different than usual or occurs with fever.  
  · You lose weight without trying, or you lose your appetite and you do not know why.  
  · Your symptoms often wake you from sleep.  
  · Your stools are black and tarlike or have streaks of blood.  
 Watch closely for changes in your health, and be sure to contact your doctor if: 
  · Your IBS symptoms get worse or begin to disrupt your day-to-day life.  
  · You become more tired than usual.  
  · Your home treatment stops working. Where can you learn more? Go to http://anaRespicardiajesus.info/. Enter Q579 in the search box to learn more about \"Irritable Bowel Syndrome: Care Instructions. \" Current as of: March 27, 2018 Content Version: 11.9 © 7383-8953 [employer]. Care instructions adapted under license by [employer] (which disclaims liability or warranty for this information). If you have questions about a medical condition or this instruction, always ask your healthcare professional. [place] [address] any warranty or liability for your use of this information. Diet for Irritable Bowel Syndrome: Care Instructions Your Care Instructions Irritable bowel syndrome, or IBS, is a problem with the intestines. IBS can cause belly pain, bloating, gas, constipation, and diarrhea. Most people can control their symptoms by changing their diet and easing stress. No specific foods cause everyone with IBS to have symptoms.  Doctors don't offer a specific diet to manage symptoms. But many people find that they feel better when they stop eating certain foods. A high-fiber diet may help if you have constipation. Follow-up care is a key part of your treatment and safety. Be sure to make and go to all appointments, and call your doctor if you are having problems. It's also a good idea to know your test results and keep a list of the medicines you take. How can you care for yourself at home? To reduce constipation · Include fruits, vegetables, beans, and whole grains in your diet each day. These foods are high in fiber. Slowly increase the amount of fiber you eat. This helps you avoid a lot of gas. · Drink plenty of fluids, enough so that your urine is light yellow or clear like water. If you have kidney, heart, or liver disease and have to limit fluids, talk with your doctor before you increase the amount of fluids you drink. · Get some exercise every day. Build up slowly to 30 to 60 minutes a day on 5 or more days of the week. · Take a fiber supplement, such as Citrucel or Metamucil, every day if needed. Read and follow all instructions on the label. · Schedule time each day for a bowel movement. Having a daily routine may help. Take your time and do not strain when having a bowel movement. · Check with your doctor before you increase the amount of fiber in your diet. For some people who have IBS, eating more fiber may make some symptoms worse. This includes bloating. To reduce diarrhea You may try giving up foods or drinks one at a time to see whether symptoms improve. Limit or avoid the following: · Alcohol · Caffeine, which is found in coffee, tea, cola drinks, and chocolate · Nicotine, from smoking or chewing tobacco 
· Gas-producing foods, such as beans, broccoli, cabbage, and apples · Dairy products that contain lactose (milk sugar), such as ice cream, milk, cheese, and sour cream 
 · Foods and drinks high in sugar, especially fruit juice, soda, candy, and other packaged sweets (such as cookies) · Foods high in fat, including rogers, sausage, butter, oils, and anything deep-fried · Sorbitol and xylitol, artificial sweeteners found in some sugarless candies and chewing gum Keep track of foods · Some people with IBS use a daily food diary to keep track of what they eat and whether they have any symptoms after eating certain foods. The diary also can be a good way to record what is going on in your life. · Stress plays a role in IBS. So if you are aware that certain stresses bring on symptoms, you can try to reduce those stresses. Keep mealtimes pleasant · Try to maintain a pleasant environment when you eat. This may reduce stress that can make symptoms likely to occur. · Give yourself plenty of time to eat, rather than eating on the go. Chew your food slowly. Try not to swallow air, which can cause bloating. Where can you learn more? Go to http://ana-jesus.info/. Enter U000 in the search box to learn more about \"Diet for Irritable Bowel Syndrome: Care Instructions. \" Current as of: March 28, 2018 Content Version: 11.9 © 6225-5968 [employer]. Care instructions adapted under license by [employer] (which disclaims liability or warranty for this information). If you have questions about a medical condition or this instruction, always ask your healthcare professional. [place] [address] any warranty or liability for your use of this information.

## 2019-07-13 LAB
ALBUMIN SERPL-MCNC: 4.4 G/DL (ref 3.5–5.5)
ALBUMIN/GLOB SERPL: 1.6 {RATIO} (ref 1.2–2.2)
ALP SERPL-CCNC: 80 IU/L (ref 39–117)
ALT SERPL-CCNC: 42 IU/L (ref 0–32)
AST SERPL-CCNC: 35 IU/L (ref 0–40)
BASOPHILS # BLD AUTO: 0.1 X10E3/UL (ref 0–0.2)
BASOPHILS NFR BLD AUTO: 1 %
BILIRUB SERPL-MCNC: 1.2 MG/DL (ref 0–1.2)
BUN SERPL-MCNC: 10 MG/DL (ref 6–20)
BUN/CREAT SERPL: 14 (ref 9–23)
CALCIUM SERPL-MCNC: 9.4 MG/DL (ref 8.7–10.2)
CHLORIDE SERPL-SCNC: 100 MMOL/L (ref 96–106)
CO2 SERPL-SCNC: 24 MMOL/L (ref 20–29)
CREAT SERPL-MCNC: 0.7 MG/DL (ref 0.57–1)
EOSINOPHIL # BLD AUTO: 0.2 X10E3/UL (ref 0–0.4)
EOSINOPHIL NFR BLD AUTO: 3 %
ERYTHROCYTE [DISTWIDTH] IN BLOOD BY AUTOMATED COUNT: 13.3 % (ref 12.3–15.4)
GLOBULIN SER CALC-MCNC: 2.7 G/DL (ref 1.5–4.5)
GLUCOSE SERPL-MCNC: 97 MG/DL (ref 65–99)
HCT VFR BLD AUTO: 42.7 % (ref 34–46.6)
HGB BLD-MCNC: 14.8 G/DL (ref 11.1–15.9)
IMM GRANULOCYTES # BLD AUTO: 0 X10E3/UL (ref 0–0.1)
IMM GRANULOCYTES NFR BLD AUTO: 0 %
LYMPHOCYTES # BLD AUTO: 2.7 X10E3/UL (ref 0.7–3.1)
LYMPHOCYTES NFR BLD AUTO: 34 %
MCH RBC QN AUTO: 32 PG (ref 26.6–33)
MCHC RBC AUTO-ENTMCNC: 34.7 G/DL (ref 31.5–35.7)
MCV RBC AUTO: 92 FL (ref 79–97)
MONOCYTES # BLD AUTO: 0.7 X10E3/UL (ref 0.1–0.9)
MONOCYTES NFR BLD AUTO: 9 %
NEUTROPHILS # BLD AUTO: 4.2 X10E3/UL (ref 1.4–7)
NEUTROPHILS NFR BLD AUTO: 53 %
PLATELET # BLD AUTO: 264 X10E3/UL (ref 150–450)
POTASSIUM SERPL-SCNC: 4.6 MMOL/L (ref 3.5–5.2)
PROT SERPL-MCNC: 7.1 G/DL (ref 6–8.5)
RBC # BLD AUTO: 4.63 X10E6/UL (ref 3.77–5.28)
SODIUM SERPL-SCNC: 139 MMOL/L (ref 134–144)
TSH SERPL DL<=0.005 MIU/L-ACNC: 1.41 UIU/ML (ref 0.45–4.5)
WBC # BLD AUTO: 7.8 X10E3/UL (ref 3.4–10.8)

## 2019-07-15 ENCOUNTER — TELEPHONE (OUTPATIENT)
Dept: INTERNAL MEDICINE CLINIC | Age: 24
End: 2019-07-15

## 2019-07-15 NOTE — TELEPHONE ENCOUNTER
----- Message from Moe Ochoa NP sent at 7/14/2019  8:15 AM EDT -----      ----- Message -----  From: Royer King Lab Results In  Sent: 7/13/2019   7:42 AM  To: Moe Ochoa NP

## 2019-07-15 NOTE — TELEPHONE ENCOUNTER
I called pt per NP to notify, labs nml. Pt verbalized understanding and needed to r/s her appt with pcp in september. appt r/s.

## 2019-07-16 NOTE — PROGRESS NOTES
HISTORY OF PRESENT ILLNESS  Elicia Camejo is a 22 y.o. female. HPI  Presents with complaints of intermittent abdominal bloating and loose stools/diarrhea that has been occurring over the past 3-4 weeks. Reports having looser stools especially after eating and has sense of fecal urgency. Has loose stools for several days followed by several days of constipation. Symptoms have been worsening since she has been feeling more stressed lately with decision to enter Graduate school at [employer]. Currently working in a job that she dislikes and feels that this contributes to her stress levels. Denies fever, chills, blood in stools. Denies nausea, vomiting, nausea, vomiting. Has been on Lexapro 20 mg for the past several months and feels like this has helped with depression and anxiety but still has stress related to making decisions about her future. Denies harmful thoughts towards self or others. Patient Active Problem List   Diagnosis Code    History of Adin-Parkinson-White (WPW) syndrome Z86.79     Past Surgical History:   Procedure Laterality Date    CARDIAC SURG PROCEDURE UNLIST      heart surgery for WPW    HX HEART CATHETERIZATION N/A 2010    Catheter ablation     Social History     Socioeconomic History    Marital status: SINGLE     Spouse name: Not on file    Number of children: Not on file    Years of education: Not on file    Highest education level: Not on file   Occupational History    Not on file   Social Needs    Financial resource strain: Not on file    Food insecurity:     Worry: Not on file     Inability: Not on file    Transportation needs:     Medical: Not on file     Non-medical: Not on file   Tobacco Use    Smoking status: Never Smoker    Smokeless tobacco: Never Used   Substance and Sexual Activity    Alcohol use:  Yes     Alcohol/week: 0.6 oz     Types: 1 Cans of beer per week     Comment: occasional    Drug use: No    Sexual activity: Yes   Lifestyle    Physical activity: Days per week: Not on file     Minutes per session: Not on file    Stress: Not on file   Relationships    Social connections:     Talks on phone: Not on file     Gets together: Not on file     Attends Taoism service: Not on file     Active member of club or organization: Not on file     Attends meetings of clubs or organizations: Not on file     Relationship status: Not on file    Intimate partner violence:     Fear of current or ex partner: Not on file     Emotionally abused: Not on file     Physically abused: Not on file     Forced sexual activity: Not on file   Other Topics Concern    Not on file   Social History Narrative    ** Merged History Encounter **          Family History   Problem Relation Age of Onset    Hypertension Mother     Diabetes Father     Cancer Maternal Aunt         breast cancer     Current Outpatient Medications   Medication Sig    dicyclomine (BENTYL) 10 mg capsule Take 1 Cap by mouth four (4) times daily.  escitalopram oxalate (LEXAPRO) 20 mg tablet Take 1 Tab by mouth daily.  norethindrone ac-eth estradiol (LOESTRIN 1/20, 21, PO) Take  by mouth.  ibuprofen (MOTRIN) 600 mg tablet Take 1 Tab by mouth every six (6) hours as needed for Pain. No current facility-administered medications for this visit.       No Known Allergies  Immunization History   Administered Date(s) Administered    DTaP 1996, 1996, 1996, 10/28/1997, 03/15/2001    HPV 07/14/2012, 06/02/2014    HPV (9-valent) 08/16/2017    HPV (Quad) 07/17/2012    Hep A Vaccine 06/28/2007, 07/14/2012    Hep B Vaccine 1996, 1996, 01/17/1997    Hib 1996, 1996, 1996    IPV 1996, 1996, 1996, 03/15/2001    MMR 07/29/1997, 08/15/2001    Meningococcal ACWY Vaccine 06/28/2007, 06/02/2014    Tdap 06/28/2007, 05/16/2017    Varicella Virus Vaccine 04/29/1997, 10/01/2008           Review of Systems   Constitutional: Negative for chills, fever and malaise/fatigue. HENT: Negative for congestion and sore throat. Respiratory: Negative for cough and shortness of breath. Cardiovascular: Negative for chest pain and palpitations. Gastrointestinal: Positive for abdominal pain, constipation and diarrhea. Negative for blood in stool, nausea and vomiting. Genitourinary: Negative for dysuria and frequency. Musculoskeletal: Negative for myalgias. Neurological: Negative for dizziness and headaches. /87 (BP 1 Location: Left arm, BP Patient Position: Sitting)   Pulse 88   Temp 98.9 °F (37.2 °C) (Oral)   Resp 18   Ht 5' 3\" (1.6 m)   Wt 162 lb (73.5 kg)   LMP 06/26/2019   SpO2 98%   BMI 28.70 kg/m²   Physical Exam   Constitutional: She is oriented to person, place, and time. She appears well-developed and well-nourished. HENT:   Head: Normocephalic and atraumatic. Right Ear: External ear normal.   Left Ear: External ear normal.   Nose: Nose normal.   Mouth/Throat: Oropharynx is clear and moist.   Neck: Normal range of motion. Neck supple. No thyromegaly present. Cardiovascular: Normal rate and regular rhythm. Pulmonary/Chest: Effort normal and breath sounds normal. She has no wheezes. Abdominal: Soft. Bowel sounds are normal. There is tenderness. There is no rebound and no guarding. Musculoskeletal: Normal range of motion. Lymphadenopathy:     She has no cervical adenopathy. Neurological: She is alert and oriented to person, place, and time. Psychiatric: She has a normal mood and affect. Her behavior is normal.   Nursing note and vitals reviewed. ASSESSMENT and PLAN  Diagnoses and all orders for this visit:    1. Abdominal bloating -- suspect that she may have IBS. -     CBC WITH AUTOMATED DIFF  -     METABOLIC PANEL, COMPREHENSIVE  -     TSH 3RD GENERATION    2. Irritable bowel syndrome with both constipation and diarrhea -- discussion regarding possible triggers including certain foods, anxiety/stressors.   - dicyclomine (BENTYL) 10 mg capsule; Take 1 Cap by mouth four (4) times daily. 3. Anxiety and depression -- currently on Lexapro 20 mg daily. lab results and schedule of future lab studies reviewed with patient  reviewed diet, exercise and weight control  reviewed medications and side effects in detail  This note will not be viewable in MyChart.

## 2019-07-18 ENCOUNTER — TELEPHONE (OUTPATIENT)
Dept: INTERNAL MEDICINE CLINIC | Age: 24
End: 2019-07-18

## 2019-07-18 NOTE — TELEPHONE ENCOUNTER
----- Message from Payton Vila sent at 7/18/2019  3:30 PM EDT -----  Regarding: Dr. Franklin Began Patient return call    Caller's first and last name and relationship (if not the patient):      Best contact number(s): [phone]      Whose call is being returned: Returning call from Delmer BASURTO      Details to clarify the request:      Payton Vila

## 2019-07-18 NOTE — TELEPHONE ENCOUNTER
Returned call to pt. She states she does not need a refill on her Bentyl and believes her pharmacy sent that over automatically. She states some days she has to take up to 3 pills and other days she doesn't have to take any.

## 2019-07-18 NOTE — TELEPHONE ENCOUNTER
----- Message from Stefan Matta sent at 7/18/2019  4:04 PM EDT -----  Regarding: Dr. Yung Boland  Patient return call    Caller's first and last name and relationship (if not the patient):  pt    Best contact number(s):  [phone]    Whose call is being returned:  Brittney Desai    Details to clarify the request:  Unsure the reason for the call    Stefan Matta

## 2019-08-13 ENCOUNTER — OFFICE VISIT (OUTPATIENT)
Dept: INTERNAL MEDICINE CLINIC | Age: 24
End: 2019-08-13

## 2019-08-13 VITALS
SYSTOLIC BLOOD PRESSURE: 110 MMHG | BODY MASS INDEX: 28.53 KG/M2 | TEMPERATURE: 99 F | HEIGHT: 63 IN | HEART RATE: 81 BPM | WEIGHT: 161 LBS | DIASTOLIC BLOOD PRESSURE: 82 MMHG | OXYGEN SATURATION: 98 % | RESPIRATION RATE: 16 BRPM

## 2019-08-13 DIAGNOSIS — Z11.1 SCREENING FOR TUBERCULOSIS: ICD-10-CM

## 2019-08-13 DIAGNOSIS — Z86.79 HISTORY OF WOLFF-PARKINSON-WHITE (WPW) SYNDROME: ICD-10-CM

## 2019-08-13 DIAGNOSIS — F41.9 ANXIETY: ICD-10-CM

## 2019-08-13 DIAGNOSIS — Z02.89 ENCOUNTER FOR COMPLETION OF FORM WITH PATIENT: ICD-10-CM

## 2019-08-13 DIAGNOSIS — Z02.0 ENCOUNTER FOR SCHOOL EXAMINATION: Primary | ICD-10-CM

## 2019-08-13 RX ORDER — VENLAFAXINE HYDROCHLORIDE 75 MG/1
75 CAPSULE, EXTENDED RELEASE ORAL DAILY
COMMUNITY
End: 2019-09-09

## 2019-08-13 NOTE — PROGRESS NOTES
Valentine Olson is a 22 y.o. female who presents today for Form Completion  . She has a history of   Patient Active Problem List   Diagnosis Code    History of Adin-Parkinson-White (WPW) syndrome Z86.79   . Today patient is here for reviewing school forms. .     She will be going to [employer] for grad-school. Masters in teaching. Wants to teach. Bachelors in Psychology at [employer]. We have all her old immunization records. She also has no signs or symptoms risk factor for tuberculosis. History of WPW. S/p ablation X 2. Last was in 2011. Notes that she still does have tachycardia but only rare palpitations. Anxiety: seeing outside psychiatrist. Trying Effexor and weaning off Lexapro. ROS  Review of Systems   Constitutional: Negative for chills, fever and weight loss. HENT: Negative for ear discharge, ear pain, hearing loss, nosebleeds and tinnitus. Eyes: Negative for blurred vision, double vision, photophobia and pain. Respiratory: Negative for cough, hemoptysis, sputum production and shortness of breath. Cardiovascular: Positive for palpitations. Negative for chest pain, orthopnea and claudication. Gastrointestinal: Negative for abdominal pain, constipation, diarrhea, heartburn, nausea and vomiting. Genitourinary: Negative for dysuria, frequency and urgency. Musculoskeletal: Negative for back pain, myalgias and neck pain. Skin: Negative for itching and rash. Neurological: Negative. Endo/Heme/Allergies: Does not bruise/bleed easily. Psychiatric/Behavioral: Negative for depression. The patient is not nervous/anxious. Visit Vitals  /82 (BP 1 Location: Left arm, BP Patient Position: Sitting)   Pulse 81   Temp 99 °F (37.2 °C) (Oral)   Resp 16   Ht 5' 3\" (1.6 m)   Wt 161 lb (73 kg)   SpO2 98%   BMI 28.52 kg/m²       Physical Exam   Constitutional: She is oriented to person, place, and time. She appears well-developed and well-nourished.    HENT:   Head: Normocephalic and atraumatic. Right Ear: External ear normal.   Left Ear: External ear normal.   Eyes: Pupils are equal, round, and reactive to light. EOM are normal.   Neck: Normal range of motion. Neck supple. No thyromegaly present. Cardiovascular: Normal rate and regular rhythm. No murmur heard. Pulmonary/Chest: Effort normal. No stridor. No respiratory distress. Abdominal: Soft. Bowel sounds are normal. She exhibits no distension and no mass. There is no tenderness. There is no guarding. Neurological: She is alert and oriented to person, place, and time. Skin: Skin is warm and dry. Psychiatric: She has a normal mood and affect. Her behavior is normal.         Current Outpatient Medications   Medication Sig    venlafaxine-SR (EFFEXOR XR) 75 mg capsule Take  by mouth daily.  dicyclomine (BENTYL) 10 mg capsule Take 1 Cap by mouth four (4) times daily.  escitalopram oxalate (LEXAPRO) 20 mg tablet Take 1 Tab by mouth daily.  norethindrone ac-eth estradiol (LOESTRIN 1/20, 21, PO) Take  by mouth.  ibuprofen (MOTRIN) 600 mg tablet Take 1 Tab by mouth every six (6) hours as needed for Pain. No current facility-administered medications for this visit. Past Medical History:   Diagnosis Date    Heart abnormalities     WPW  SVT      Past Surgical History:   Procedure Laterality Date    CARDIAC SURG PROCEDURE UNLIST      heart surgery for WPW    HX HEART CATHETERIZATION N/A 2010    Catheter ablation      Social History     Tobacco Use    Smoking status: Never Smoker    Smokeless tobacco: Never Used   Substance Use Topics    Alcohol use: Yes     Alcohol/week: 1.0 standard drinks     Types: 1 Cans of beer per week     Comment: occasional      Family History   Problem Relation Age of Onset    Hypertension Mother     Diabetes Father     Cancer Maternal Aunt         breast cancer        No Known Allergies     Assessment/Plan  Diagnoses and all orders for this visit:    1.  Encounter for school examination -low risk for tuberculosis. No need for screening test.  Immunizations are all up-to-date. We discussed meningitis B vaccine. 2. Encounter for completion of form with patient    3. Screening for tuberculosis    4. History of Adin-Parkinson-White (WPW) syndrome -status post ablation. 5.  Anxiety-patient working with psychiatrist.  Currently weaning off Lexapro and trying Effexor. Pratima Jacques MD  8/13/2019    This note was created with the help of speech recognition software Pancho Linton) and may contain some 'sound alike' errors.

## 2019-09-05 ENCOUNTER — HOSPITAL ENCOUNTER (INPATIENT)
Age: 24
LOS: 4 days | Discharge: HOME | DRG: 885 | End: 2019-09-09
Attending: EMERGENCY MEDICINE | Admitting: PSYCHIATRY & NEUROLOGY
Payer: COMMERCIAL

## 2019-09-05 DIAGNOSIS — R45.851 SUICIDAL THOUGHTS: Primary | ICD-10-CM

## 2019-09-05 DIAGNOSIS — F32.A DEPRESSION, UNSPECIFIED DEPRESSION TYPE: ICD-10-CM

## 2019-09-05 LAB
ALBUMIN SERPL-MCNC: 4.1 G/DL (ref 3.5–5)
ALBUMIN/GLOB SERPL: 1 {RATIO} (ref 1.1–2.2)
ALP SERPL-CCNC: 94 U/L (ref 45–117)
ALT SERPL-CCNC: 31 U/L (ref 12–78)
AMPHET UR QL SCN: NEGATIVE
ANION GAP SERPL CALC-SCNC: 10 MMOL/L (ref 5–15)
APAP SERPL-MCNC: <2 UG/ML (ref 10–30)
APPEARANCE UR: CLEAR
AST SERPL-CCNC: 23 U/L (ref 15–37)
ATRIAL RATE: 104 BPM
BACTERIA URNS QL MICRO: NEGATIVE /HPF
BARBITURATES UR QL SCN: NEGATIVE
BASOPHILS # BLD: 0 K/UL (ref 0–0.1)
BASOPHILS NFR BLD: 0 % (ref 0–1)
BENZODIAZ UR QL: NEGATIVE
BILIRUB SERPL-MCNC: 1.2 MG/DL (ref 0.2–1)
BILIRUB UR QL: NEGATIVE
BUN SERPL-MCNC: 7 MG/DL (ref 6–20)
BUN/CREAT SERPL: 9 (ref 12–20)
CALCIUM SERPL-MCNC: 9.5 MG/DL (ref 8.5–10.1)
CALCULATED P AXIS, ECG09: 56 DEGREES
CALCULATED R AXIS, ECG10: 49 DEGREES
CALCULATED T AXIS, ECG11: 30 DEGREES
CANNABINOIDS UR QL SCN: NEGATIVE
CHLORIDE SERPL-SCNC: 105 MMOL/L (ref 97–108)
CO2 SERPL-SCNC: 25 MMOL/L (ref 21–32)
COCAINE UR QL SCN: NEGATIVE
COLOR UR: ABNORMAL
CREAT SERPL-MCNC: 0.78 MG/DL (ref 0.55–1.02)
DIAGNOSIS, 93000: NORMAL
DIFFERENTIAL METHOD BLD: NORMAL
DRUG SCRN COMMENT,DRGCM: NORMAL
EOSINOPHIL # BLD: 0 K/UL (ref 0–0.4)
EOSINOPHIL NFR BLD: 0 % (ref 0–7)
EPITH CASTS URNS QL MICRO: ABNORMAL /LPF
ERYTHROCYTE [DISTWIDTH] IN BLOOD BY AUTOMATED COUNT: 11.7 % (ref 11.5–14.5)
ETHANOL SERPL-MCNC: <10 MG/DL
GLOBULIN SER CALC-MCNC: 4.3 G/DL (ref 2–4)
GLUCOSE SERPL-MCNC: 101 MG/DL (ref 65–100)
GLUCOSE UR STRIP.AUTO-MCNC: NEGATIVE MG/DL
HCG SERPL QL: NEGATIVE
HCG UR QL: NEGATIVE
HCT VFR BLD AUTO: 43.1 % (ref 35–47)
HGB BLD-MCNC: 15.1 G/DL (ref 11.5–16)
HGB UR QL STRIP: ABNORMAL
HYALINE CASTS URNS QL MICRO: ABNORMAL /LPF (ref 0–5)
IMM GRANULOCYTES # BLD AUTO: 0 K/UL (ref 0–0.04)
IMM GRANULOCYTES NFR BLD AUTO: 0 % (ref 0–0.5)
KETONES UR QL STRIP.AUTO: ABNORMAL MG/DL
LEUKOCYTE ESTERASE UR QL STRIP.AUTO: NEGATIVE
LYMPHOCYTES # BLD: 1.7 K/UL (ref 0.8–3.5)
LYMPHOCYTES NFR BLD: 22 % (ref 12–49)
MCH RBC QN AUTO: 31.7 PG (ref 26–34)
MCHC RBC AUTO-ENTMCNC: 35 G/DL (ref 30–36.5)
MCV RBC AUTO: 90.4 FL (ref 80–99)
METHADONE UR QL: NEGATIVE
MONOCYTES # BLD: 0.5 K/UL (ref 0–1)
MONOCYTES NFR BLD: 6 % (ref 5–13)
NEUTS SEG # BLD: 5.7 K/UL (ref 1.8–8)
NEUTS SEG NFR BLD: 72 % (ref 32–75)
NITRITE UR QL STRIP.AUTO: NEGATIVE
NRBC # BLD: 0 K/UL (ref 0–0.01)
NRBC BLD-RTO: 0 PER 100 WBC
OPIATES UR QL: NEGATIVE
P-R INTERVAL, ECG05: 128 MS
PCP UR QL: NEGATIVE
PH UR STRIP: 6.5 [PH] (ref 5–8)
PLATELET # BLD AUTO: 293 K/UL (ref 150–400)
PMV BLD AUTO: 9.8 FL (ref 8.9–12.9)
POTASSIUM SERPL-SCNC: 3.8 MMOL/L (ref 3.5–5.1)
PROT SERPL-MCNC: 8.4 G/DL (ref 6.4–8.2)
PROT UR STRIP-MCNC: NEGATIVE MG/DL
Q-T INTERVAL, ECG07: 356 MS
QRS DURATION, ECG06: 84 MS
QTC CALCULATION (BEZET), ECG08: 461 MS
RBC # BLD AUTO: 4.77 M/UL (ref 3.8–5.2)
RBC #/AREA URNS HPF: ABNORMAL /HPF (ref 0–5)
SALICYLATES SERPL-MCNC: <1.7 MG/DL (ref 2.8–20)
SODIUM SERPL-SCNC: 140 MMOL/L (ref 136–145)
SP GR UR REFRACTOMETRY: 1.01 (ref 1–1.03)
UR CULT HOLD, URHOLD: NORMAL
UROBILINOGEN UR QL STRIP.AUTO: 1 EU/DL (ref 0.2–1)
VENTRICULAR RATE, ECG03: 101 BPM
WBC # BLD AUTO: 8 K/UL (ref 3.6–11)
WBC URNS QL MICRO: ABNORMAL /HPF (ref 0–4)

## 2019-09-05 PROCEDURE — 80053 COMPREHEN METABOLIC PANEL: CPT

## 2019-09-05 PROCEDURE — 80307 DRUG TEST PRSMV CHEM ANLYZR: CPT

## 2019-09-05 PROCEDURE — 36415 COLL VENOUS BLD VENIPUNCTURE: CPT

## 2019-09-05 PROCEDURE — 81001 URINALYSIS AUTO W/SCOPE: CPT

## 2019-09-05 PROCEDURE — 90791 PSYCH DIAGNOSTIC EVALUATION: CPT

## 2019-09-05 PROCEDURE — 99285 EMERGENCY DEPT VISIT HI MDM: CPT

## 2019-09-05 PROCEDURE — 84703 CHORIONIC GONADOTROPIN ASSAY: CPT

## 2019-09-05 PROCEDURE — 81025 URINE PREGNANCY TEST: CPT

## 2019-09-05 PROCEDURE — 85025 COMPLETE CBC W/AUTO DIFF WBC: CPT

## 2019-09-05 PROCEDURE — 65220000003 HC RM SEMIPRIVATE PSYCH

## 2019-09-05 PROCEDURE — 93005 ELECTROCARDIOGRAM TRACING: CPT

## 2019-09-05 RX ORDER — HYDROXYZINE 50 MG/1
50 TABLET, FILM COATED ORAL
Status: DISCONTINUED | OUTPATIENT
Start: 2019-09-05 | End: 2019-09-09 | Stop reason: HOSPADM

## 2019-09-05 RX ORDER — ADHESIVE BANDAGE
30 BANDAGE TOPICAL DAILY PRN
Status: DISCONTINUED | OUTPATIENT
Start: 2019-09-05 | End: 2019-09-09 | Stop reason: HOSPADM

## 2019-09-05 RX ORDER — ACETAMINOPHEN 325 MG/1
650 TABLET ORAL
Status: DISCONTINUED | OUTPATIENT
Start: 2019-09-05 | End: 2019-09-09 | Stop reason: HOSPADM

## 2019-09-05 RX ORDER — LORAZEPAM 2 MG/ML
1 INJECTION INTRAMUSCULAR
Status: DISCONTINUED | OUTPATIENT
Start: 2019-09-05 | End: 2019-09-09 | Stop reason: HOSPADM

## 2019-09-05 RX ORDER — DIPHENHYDRAMINE HYDROCHLORIDE 50 MG/ML
50 INJECTION, SOLUTION INTRAMUSCULAR; INTRAVENOUS
Status: DISCONTINUED | OUTPATIENT
Start: 2019-09-05 | End: 2019-09-09 | Stop reason: HOSPADM

## 2019-09-05 RX ORDER — DICYCLOMINE HYDROCHLORIDE 10 MG/1
10 CAPSULE ORAL 4 TIMES DAILY
Status: DISCONTINUED | OUTPATIENT
Start: 2019-09-05 | End: 2019-09-09 | Stop reason: HOSPADM

## 2019-09-05 RX ORDER — OLANZAPINE 5 MG/1
5 TABLET ORAL
Status: DISCONTINUED | OUTPATIENT
Start: 2019-09-05 | End: 2019-09-09 | Stop reason: HOSPADM

## 2019-09-05 RX ORDER — IBUPROFEN 200 MG
400 CAPSULE ORAL
COMMUNITY
End: 2019-09-09

## 2019-09-05 RX ORDER — HALOPERIDOL 5 MG/ML
5 INJECTION INTRAMUSCULAR
Status: DISCONTINUED | OUTPATIENT
Start: 2019-09-05 | End: 2019-09-09 | Stop reason: HOSPADM

## 2019-09-05 RX ORDER — BENZTROPINE MESYLATE 1 MG/1
1 TABLET ORAL
Status: DISCONTINUED | OUTPATIENT
Start: 2019-09-05 | End: 2019-09-09 | Stop reason: HOSPADM

## 2019-09-05 RX ORDER — TRAZODONE HYDROCHLORIDE 50 MG/1
50 TABLET ORAL
Status: DISCONTINUED | OUTPATIENT
Start: 2019-09-05 | End: 2019-09-09 | Stop reason: HOSPADM

## 2019-09-05 RX ORDER — IBUPROFEN 400 MG/1
400 TABLET ORAL
Status: DISCONTINUED | OUTPATIENT
Start: 2019-09-05 | End: 2019-09-09 | Stop reason: HOSPADM

## 2019-09-05 NOTE — BH NOTES
Patient admitted voluntarily to [address] [place] [address], under the services of . Patient currently admits with suicidal ideation. Patient currently denies homicidal ideation. Patient verbally contracts for safety. Patient denies psychotic symptoms. Pt reports ETOH use on an occasional, social basis. Pt denies drug use.
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
## 2019-09-05 NOTE — PROGRESS NOTES
Admission Medication Reconciliation:    Information obtained from:  Patient  RxQuery data available¹:   No    Comments/Recommendations: Updated PTA meds/reviewed patient's allergies. 1)  I completed the medication reconciliation with the patient in the presence of her father with her permission. Of note, she took venlafaxine 75mg around 1200 today. She has not taken her birth control since Tuesday. She will use dicyclomine on an as needed basis for IBS. She will also use OTC advil as needed for pain. 2)  Medication changes (since last review): Added  - Advil 200 mg -- 2 caps PO every day PRN pain    Adjusted  - None    Removed  - Escitalopram 20 mg  - Ibuprofen 600 mg    3)  I confirmed the patient has NKDA. ¹RxQuery pharmacy benefit data reflects medications filled and processed through the patient's insurance, however   this data does NOT capture whether the medication was picked up or is currently being taken by the patient. Allergies:  Patient has no known allergies. Significant PMH/Disease States:   Past Medical History:   Diagnosis Date    Heart abnormalities     WPW  SVT     Chief Complaint for this Admission:    Chief Complaint   Patient presents with    Mental Health Problem     Prior to Admission Medications:   Prior to Admission Medications   Prescriptions Last Dose Informant Patient Reported? Taking?   dicyclomine (BENTYL) 10 mg capsule 8/29/2019 at Unknown time  No Yes   Sig: Take 1 Cap by mouth four (4) times daily. ibuprofen (ADVIL LIQUI-GEL) 200 mg cap 8/29/2019 at Unknown time  Yes Yes   Sig: Take 400 mg by mouth daily as needed for Pain. norethindrone ac-eth estradiol (LOESTRIN 1/20, 21, PO) 8/29/2019 at Unknown time  Yes Yes   Sig: Take  by mouth. venlafaxine-SR (EFFEXOR XR) 75 mg capsule 9/5/2019 at 1200  Yes Yes   Sig: Take  by mouth daily.       Facility-Administered Medications: None       Please contact the main inpatient pharmacy with any questions or concerns at [phone] and we will direct you to the clinical pharmacist covering this patient's care while in-house.      Noemy Mcleod, PharmD Candidate [id number]

## 2019-09-05 NOTE — ROUTINE PROCESS
TRANSFER - OUT REPORT:    Verbal report given to Jacobo Arreola RN (name) on Noemi Wilcox  being transferred to [facility] (unit) for routine progression of care       Report consisted of patients Situation, Background, Assessment and   Recommendations(SBAR). Information from the following report(s) SBAR, Kardex, ED Summary, Intake/Output, MAR and Recent Results was reviewed with the receiving nurse. Lines:       Opportunity for questions and clarification was provided.       Patient transported with:   [facility]

## 2019-09-05 NOTE — ED TRIAGE NOTES
Triage: Patient arrives from home with c/o suicidal ideations and depression. Patient recently broke up with her boyfriend and reported suicidal ideations to EMS. Patient denies those thoughts now and refuses to wear a gown.

## 2019-09-05 NOTE — BH NOTES
Pt sits at dining room table surrounded by peers. She is smiling and bright as she talks about studying to be a . Patient is coloring and smiling. Pt minimizes reason for admission. She states she is here for a misunderstanding.

## 2019-09-05 NOTE — BSMART NOTE
Patient placed under an ECO at 10:10am by [employer] officer. [employer] contacted THE [facility] crisis who will be out to assess patient. Informed Access of need for a bed and ECO status. 11:15am: THE [facility] Crisis at bedside. [employer] still stationed in doorway.     Rhona Mcconnell LPC [facility]

## 2019-09-05 NOTE — PROGRESS NOTES
TRANSFER - IN REPORT:    Verbal report received from Isabela(name) on [facility]  being received from ED(unit) for psych admission. Report consisted of patients Situation, Background, Assessment and   Recommendations(SBAR). Information from the following report(s) SBAR was reviewed with the receiving nurse. Opportunity for questions and clarification was provided. Assessment completed upon patients arrival to unit and care assumed.
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
## 2019-09-05 NOTE — PROGRESS NOTES
Admission Medication Reconciliation:    Information obtained from:  Patient  RxQuery data available¹:   No    Comments/Recommendations: Updated PTA meds/reviewed patient's allergies. 1)  I completed the medication reconciliation with the patient in the presence of her father with her permission. Of note, she took venlafaxine 75mg around 1200 today. She has not taken her birth control since Tuesday. She will use dicyclomine on an as needed basis for IBS. She will also use OTC advil as needed for pain. 2)  Medication changes (since last review): Added  - Advil 200 mg -- 2 caps PO every day PRN pain    Adjusted  - None    Removed  - Escitalopram 20 mg  - Ibuprofen 600 mg    3)  I confirmed the patient has NKDA. ¹RxQuery pharmacy benefit data reflects medications filled and processed through the patient's insurance, however   this data does NOT capture whether the medication was picked up or is currently being taken by the patient. Allergies:  Patient has no known allergies. Significant PMH/Disease States:   Past Medical History:   Diagnosis Date    Heart abnormalities     WPW  SVT     Chief Complaint for this Admission:    Chief Complaint   Patient presents with    Mental Health Problem     Prior to Admission Medications:   Prior to Admission Medications   Prescriptions Last Dose Informant Patient Reported? Taking?   dicyclomine (BENTYL) 10 mg capsule 8/29/2019 at Unknown time  No Yes   Sig: Take 1 Cap by mouth four (4) times daily. ibuprofen (ADVIL LIQUI-GEL) 200 mg cap 8/29/2019 at Unknown time  Yes Yes   Sig: Take 400 mg by mouth daily as needed for Pain. norethindrone ac-eth estradiol (LOESTRIN 1/20, 21, PO) 8/29/2019 at Unknown time  Yes Yes   Sig: Take  by mouth. venlafaxine-SR (EFFEXOR XR) 75 mg capsule 9/5/2019 at 1200  Yes Yes   Sig: Take  by mouth daily.       Facility-Administered Medications: None       Please contact the main inpatient pharmacy with any questions or concerns at [phone] and we will direct you to the clinical pharmacist covering this patient's care while in-house.      Sj Muñoz, PharmD Candidate [id number]

## 2019-09-05 NOTE — BH NOTES
Primary Nurse Dana Briseno RN and Anna Downing RN performed a dual skin assessment on this patient: tattoo to left upper back. No pressure ulcer noted. Armani score is 23     Shirt, shorts, bra and sandals left with pt.

## 2019-09-06 PROCEDURE — 74011250637 HC RX REV CODE- 250/637: Performed by: PSYCHIATRY & NEUROLOGY

## 2019-09-06 PROCEDURE — 65220000003 HC RM SEMIPRIVATE PSYCH

## 2019-09-06 RX ORDER — VENLAFAXINE HYDROCHLORIDE 150 MG/1
150 CAPSULE, EXTENDED RELEASE ORAL
Status: DISCONTINUED | OUTPATIENT
Start: 2019-09-06 | End: 2019-09-09 | Stop reason: HOSPADM

## 2019-09-06 RX ADMIN — ACETAMINOPHEN 650 MG: 325 TABLET, FILM COATED ORAL at 17:12

## 2019-09-06 RX ADMIN — VENLAFAXINE HYDROCHLORIDE 150 MG: 150 CAPSULE, EXTENDED RELEASE ORAL at 12:59

## 2019-09-06 NOTE — BH NOTES
PSYCHOSOCIAL ASSESSMENT  :Patient identifying info:  Sandrea Kussmaul is a 22 y.o., female admitted 9/5/2019  7:47 AM     Presenting problem and precipitating factors: Pt was voluntarily admitted to unit for worsening depression and SI with plan to crash her car. Pt boyfriend disclosed he had been cheating and Pt told boyfriend and other girl that she wanted to kill herself. Mental status assessment: alert, oriented, tearful, angry, irritable    Strengths: supportive family    Collateral information: father, Ze Hammer ([phone]) mother, Britt Mas ([phone])    Current psychiatric /substance abuse providers and contact info: Clint Paulino NP    Previous psychiatric/substance abuse providers and response to treatment: PCP    Family history of mental illness or substance abuse: none indicated    Substance abuse history:  none indicated  Social History     Tobacco Use    Smoking status: Never Smoker    Smokeless tobacco: Never Used   Substance Use Topics    Alcohol use: Yes     Alcohol/week: 1.0 standard drinks     Types: 1 Cans of beer per week     Comment: occasional       History of biomedical complications associated with substance abuse: none indicated    Patient's current acceptance of treatment or motivation for change:    Family constellation: mother, father and sister    Is significant other involved?  No, recently broke up PTA, which triggered SI     Describe support system: parents and sister    Describe living arrangements and home environment: lives with 2 roommates in [address] issues:   Hospital Problems  Date Reviewed: 7/15/2019          Codes Class Noted POA    Depression ICD-10-CM: F32.9  ICD-9-CM: 165  9/5/2019 Unknown              Trauma history: none indicated    Legal issues: none indicated    History of  service: none indicated    Financial status: family provides financial support / [employer] insurance    Lutheran/cultural factors: [facility]    Education/work history: enrolled in graduate school at [address] [place] [address] 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

## 2019-09-06 NOTE — BH NOTES
GROUP THERAPY PROGRESS NOTE    Lindsay Blue is participating in [place].      Group time: 30 minutes    Personal goal for participation: Willma Like being discharged    Goal orientation: community    Group therapy participation: active    Therapeutic interventions reviewed and discussed: yes    Impression of participation: engaged

## 2019-09-06 NOTE — BH NOTES
GROUP THERAPY PROGRESS NOTE    Shea Marcos participated in the General Unit's Process Group, with a focus identifying feelings and planning for the day. Group time: [id number]  [id number]     Personal goal for participation: To increase the capacity to improve ones mood and structure. Goal orientation: The patient will be able to identify their feelings, develop a plan for structuring their day, and discharge planning. Group therapy participation: With prompting, this patient actively participated in the group. Therapeutic interventions reviewed and discussed: The group members were asked to introduce themselves to each other and to see if they could identify an emotion they are having and/or let the group know what they want to focus on for the day as they continue to make discharge plans. Impression of participation: The patient initially said she was feeling, \"Fine. \" She quickly added that she recently found out that her boyfriend had cheated on her with a woman that she had been friends with. The patient admitted to perhaps saying some things that could have been misinterpreted in front of this former female friend and this caused the former friend to call the police because of her concern about the patient's suicidal potential. The patient finally admitted she did say something about not wanting to live, when she initially found out about the betrayal of her boyfriend and former female friend. The patient added that she had an undergraduate major in psychology and is a  at [employer], studying for a [employer]. 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 The patient was alert, generally oriented, and engaged in the group process. She was largely passive while her peers spoke but acknowledged that she was touched by what she had heard.    The patient expressed no current SI, but admitted to vague SI, without a specific plan or intention before coming into the hospital. She displayed no HI and no overt psychotic symptoms. 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. Her affect was primarily depressed and her mood reflected her affect along with some grief over the loss of the relationship with former boyfriend and the betrayal she felt over the actions of her former female friend. She clearly identified as a student and had several text books at her feet while sitting in the group. She was encouraged to think about for her outpatient therapy, how she might provide for herself what she was looking for in her male relationships, in order that she did not feel so needy. This was the patient's first process group with the undersigned.

## 2019-09-06 NOTE — BH NOTES
GROUP THERAPY PROGRESS NOTE    Amaris Goodrich  is participating in Reflections Group. Group time: 30 minutes    Personal goal for participation:     Goal orientation: relaxation    Group therapy participation: active    Therapeutic interventions reviewed and discussed: Provided a healthy night snack, went over different ways to relax and positive sleep habits, and engaged in meaningful conversation about multiple topics regarding the day/life. Impression of participation: Patient was engaged, and left relaxed. Pt was also present, but passive at many times. Earlier in the day she was yelling on the phone; then around peers she appeared smiling, coloring, and laughing; when parents, step mother, and sister visited patient retracted back to verbally defensive behavior, and was observed by Guillermina Nino saying \"I did it for attention, now get me out of here! \"

## 2019-09-06 NOTE — PROGRESS NOTES
Problem: Discharge Planning  Goal: *Discharge to safe environment  Outcome: Progressing Towards Goal  Note:   Patient identifies home as a safe environment. Patient will return home upon discharge. Goal: *Knowledge of medication management  Outcome: Progressing Towards Goal  Note:   Patient is taking medications as prescribed.   Goal: *Knowledge of discharge instructions  Outcome: Progressing Towards Goal  Note:   Patient verbalizes understanding of goals for treatment and safe discharge

## 2019-09-06 NOTE — INTERDISCIPLINARY ROUNDS
Behavioral Health Interdisciplinary Rounds     Patient Name: Kathy Engle  Age: 22 y.o. Room/Bed:  727/02  Primary Diagnosis: <principal problem not specified>   Admission Status: Voluntary     Readmission within 30 days: no  Power of  in place: no  Patient requires a blocked bed: no          Reason for blocked bed:     VTE Prophylaxis: No    Mobility needs/Fall risk: no  Flu Vaccine : no   Nutritional Plan: no  Consults:          Labs/Testing due today?: no    Sleep hours: 6      Participation in Care/Groups:  New Admission  Medication Compliant?: Refusing Medical Medications  PRNS (last 24 hours): None    Restraints (last 24 hours):  no     CIWA (range last 24 hours): CIWA-Ar Total: 0   COWS (range last 24 hours): COWS Total: 1    Alcohol screening (AUDIT) completed -   AUDIT Score: 2     If applicable, date SBIRT discussed in treatment team AND documented:   AUDIT Screen Score: AUDIT Score: 2    Tobacco - patient is a smoker: Have You Used Tobacco in the Past 30 Days: No  Illegal Drugs use: Have You Used Any Illegal Substances Over the Past 12 Months: No    24 hour chart check complete: yes     Patient goal(s) for today: Meet with treatment team.   Treatment team focus/goals: Assess treatment and discharge needs. Progress note:  Pt is alert, oriented, excited, irritable, tearful. Pt asking to leave AMA and become tearful, loud, and agitated when told she would have to be evaluated by the county. LOS:  1  Expected LOS: TBD    Financial concerns/prescription coverage:  Cigna  Family contact: RN spoke with family today, 9/6/19      Family requesting physician contact today:  no  Discharge plan: return to apartment in [place], [place]  Access to weapons : No     Outpatient provider(s): Ann Buchanan NP  Patient's preferred phone number for follow up call :     Participating treatment team members: Dr. Maranda De Anda MD; Chris Lord RN; Yaz Castillo, PharmD;  Cherise Lesches, MSW

## 2019-09-06 NOTE — PROGRESS NOTES
Problem: Depressed Mood (Adult/Pediatric)  Goal: *STG: Remains safe in hospital  Outcome: Progressing Towards Goal     Pt resting in bed quietly. Respirations equal. Will continue to monitor.

## 2019-09-06 NOTE — PROGRESS NOTES
Problem: Depressed Mood (Adult/Pediatric)  Goal: *STG: Participates in treatment plan  Outcome: Progressing Towards Goal  Goal: *STG: Verbalizes anger, guilt, and other feelings in a constructive manor  Outcome: Progressing Towards Goal  Goal: *STG: Attends activities and groups  Outcome: Progressing Towards Goal  Goal: *STG: Demonstrates reduction in symptoms and increase in insight into coping skills/future focused  Outcome: Progressing Towards Goal  Goal: Interventions  Outcome: Progressing Towards Goal   Will continue to observe for any mood/changes  Pt remains cooperative and calm

## 2019-09-06 NOTE — GROUP NOTE
[facility] GROUP DOCUMENTATION INDIVIDUAL                                                                          Group Therapy Note    Date: September 6    Group Start Time: 2035  Group End Time: 2115  Group Topic: Reflection/Relaxation    [facility] 7W GEN BEHAV HLTH    Flex Crowe    IP [address] GROUP DOCUMENTATION GROUP    Group Therapy Note    Attendees: 10/11      Attendance: Attended    Patient's Goal:  Discuss importance of self-care    Interventions/techniques: Informed, Promoted peer support and Reinforced    Follows Directions: Followed directions    Interactions: Interacted appropriately    Mental Status: Calm    Behavior/appearance: Attentive, Cooperative and Neatly groomed    Goals Achieved: Able to engage in interactions, Able to listen to others and Able to reflect/comment on own behavior    Additional Notes: Staff provided self-care assessment for group to complete individually to assess their level of self-care in their daily routine. Staff encouraged group to share their strengths and weaknesses and to reflect on what could be done to incorporate more time for self-care in daily schedule. Stated she feels she does well with physical self-care but needs to not worry so much about school and not worry so much about boyfriend breaking up with her. Stated she needs to work on psychological well-being more.     Jem Moran

## 2019-09-06 NOTE — PROGRESS NOTES
[address] [facility] 60  Master Treatment Plan for Liz Dial Treatment Plan Initiated: 9/6/19    Treatment Plan Modalities:  Type of Modality Amount  (x minutes) Frequency (x/week) Duration (x days) Name of Responsible Staff   Community & wrap-up meetings to encourage peer interactions 15 7 1 Rios      Group psychotherapy to assist in building coping skills and internal controls 60 7 1 Saman Aviles   Therapeutic activity groups to build coping skills 60 7 1 Saman Aviles   Psychoeducation in group setting to address:   Medication education   15 7 1 [id number] [facility] skills         Relaxation techniques         Symptom management         Discharge planning   60 2 255 [facility]    60 2 1 150 [facility] 66   60 1 1 volunteer   Recovery/AA/NA      volunteer   Physician medication management   15 7 1 Dr. Pratibha Jefferson meeting/discharge planning   13 2 1400 [facility]                                     These goals will be met by 9/8/19  Problem: Depressed Mood (Adult/Pediatric)  Goal: *STG: Participates in treatment plan  Outcome: Progressing Towards Goal  Note:   Out on the unit social w peers and staff. Mood and affect brighter and hopeful. Frustrated about being admitted and requested discharged. Verbalized understanding of TDO evaluation process and states she would like to be evaluated. Staff focus is on d/c planning and follow up care. Goal: *STG: Verbalizes anger, guilt, and other feelings in a constructive manor  Outcome: Progressing Towards Goal  Goal: *STG: Attends activities and groups  Outcome: Progressing Towards Goal  Goal: *STG: Demonstrates reduction in symptoms and increase in insight into coping skills/future focused  Outcome: Progressing Towards Goal  Goal: Interventions  Outcome: Progressing Towards Goal     1300: [facility] called Mikaela spoke with pt on phone. Pt decided to stay.  Noted pt increase her irritability and yelling during phone call. Easily calm when redirected    1430: spoke with father and sister about plan of care. Both states they feel the patient is safe to be home, stable in her mood and making sound decisions. They both states they feeling she would be better treated as outpatient with her therapist and PCP. Verbalized understanding of her admission status and outcome of her TDO evaluation with [facility].

## 2019-09-07 PROCEDURE — 65220000003 HC RM SEMIPRIVATE PSYCH

## 2019-09-07 PROCEDURE — 74011250637 HC RX REV CODE- 250/637: Performed by: PSYCHIATRY & NEUROLOGY

## 2019-09-07 RX ADMIN — HYDROXYZINE HYDROCHLORIDE 50 MG: 50 TABLET, FILM COATED ORAL at 16:56

## 2019-09-07 RX ADMIN — DICYCLOMINE HYDROCHLORIDE 10 MG: 10 CAPSULE ORAL at 09:16

## 2019-09-07 RX ADMIN — DICYCLOMINE HYDROCHLORIDE 10 MG: 10 CAPSULE ORAL at 21:22

## 2019-09-07 RX ADMIN — VENLAFAXINE HYDROCHLORIDE 150 MG: 150 CAPSULE, EXTENDED RELEASE ORAL at 09:16

## 2019-09-07 NOTE — H&P
[address] [facility] HISTORY AND PHYSICAL    Name:  Kodak Hein  MR#:  [id number]  :  1995  ACCOUNT #:  [de-identified]  ADMIT DATE:  2019    INITIAL  PSYCHIATRIC INTERVIEW    CHIEF COMPLAINT:  \"I feel better and want to go home. \"    HISTORY OF PRESENT ILLNESS:  The patient is a 64-year-old  female who is currently admitted with a history of expressing suicide. She had told to a friend of hers that she wanted to drive off the road. She states that she broke up with her boyfriend 2 days ago after she found out that he was cheating on her. She had expressed suicidal thoughts to him also. When asked about it, she says that she has no intention of committing suicide and that she was making suicidal threats because she just wanted attention and she wanted her boyfriend to feel bad. There is a history of cutting according to the EMR but she denies this. States that when she was younger she used her thumbs to make marks on her arms but has never cut herself. Denies use of alcohol or other substances and urine drug screen was negative. States that her mood is good today and she is requesting to be discharged. Denies any psychotic symptoms at the present time. PAST MEDICAL HISTORY:  Reviewed as per the history and physical exam.    PAST PSYCHIATRIC HISTORY:  The patient reports that she has been in psychiatric treatment and on antidepressants for several years now. She had previously been treated with Lexapro which she felt was not helping and was switched to Effexor recently. She sees Benita Agudelo who is a nurse practitioner for her medications and also sees a therapist.  Denies any prior psychiatric hospitalizations or suicide attempts. PSYCHOSOCIAL HISTORY:  The patient is [place] native and was born and brought up here. 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   She is single at the present time and recently broke up with her boyfriend. She has never been  and does not have any children. Currently, she is unemployed and her parents support her. Denies any major legal stressors. MENTAL STATUS EXAMINATION:  The patient is a young  female who is dressed in casual street clothes. She is calm, pleasant, and cooperative initially during the interview but on being told that she could not be discharged today, she became somewhat agitated, started crying and talking loudly. However, she could be redirected from this. Speech is otherwise spontaneous and coherent. Affect is reactive and mood is reported as being okay. Denies any suicidal ideation or plan. Denies any perceptual abnormalities. Denies any delusions. Her thought process is logical and goal directed. Cognitively, she is awake and alert, oriented to time, place, and person. Intelligence is average, memory is intact and fund of knowledge is adequate. Insight is fair. Judgment is poor. ASSESSMENT AND PLAN/DIAGNOSES:  Mood disorder unspecified, rule out bipolar II disorder, rule out recurrent major depression, rule out borderline personality disorder. I will continue her inpatient stay. She will be provided with support and attend groups. At the present time, she is not interested in making any changes to her medication. [address] staff will be called for TDO evaluation. Her strengths include support from her family and stable housing.         ROBYN POTTER MD      [id number]  D:  09/06/2019 17:11  T:  09/06/2019 17:21  JOB #:  [id number]

## 2019-09-07 NOTE — BH NOTES
Psychiatric Follow Up Note        Date: 9/7/2019  Account Number:  [de-identified]  Name: Abby Bradford PROGRESS NOTE:  To include the following:   Coordinated treatment team rounds conducted with patient. Discussions held with nursing staff. Chart reviewed in full including consultant notes, ancillary staff notes, vitals and labs in [facility] EMR reviewed in full. SUBJECTIVE:   Patient reports the following:  Slept better. No SI today. Feels better overall. Side Effects:  None reported or admitted to. OBJECTIVE:                   Mental Status exam:   The patient is a young  female who is dressed in casual street clothes. She is calm, pleasant, and cooperative. Speech is otherwise spontaneous and coherent. Affect is reactive and mood is reported as being okay. Denies any suicidal ideation or plan. Denies any perceptual abnormalities. Denies any delusions. Her thought process is logical and goal directed. Cognitively, she is awake and alert, oriented to time, place, and person. Intelligence is average, memory is intact and fund of knowledge is adequate. Insight is fair. Judgment is poor. Pertinent data:  Patient Vitals for the past 8 hrs:   BP Temp Pulse Resp SpO2   09/07/19 0952 (!) 137/97 97.8 °F (36.6 °C) 100 18 98 %     No results found for this or any previous visit (from the past 24 hour(s)).     Medications:  Current Facility-Administered Medications   Medication Dose Route Frequency    venlafaxine-SR (EFFEXOR-XR) capsule 150 mg  150 mg Oral DAILY WITH BREAKFAST    OLANZapine (ZyPREXA) tablet 5 mg  5 mg Oral Q6H PRN    haloperidol lactate (HALDOL) injection 5 mg  5 mg IntraMUSCular Q6H PRN    benztropine (COGENTIN) tablet 1 mg  1 mg Oral BID PRN    diphenhydrAMINE (BENADRYL) injection 50 mg  50 mg IntraMUSCular BID PRN    hydrOXYzine HCl (ATARAX) tablet 50 mg  50 mg Oral TID PRN    LORazepam (ATIVAN) injection 1 mg  1 mg IntraMUSCular Q4H PRN    traZODone (DESYREL) tablet 50 mg  50 mg Oral QHS PRN    acetaminophen (TYLENOL) tablet 650 mg  650 mg Oral Q4H PRN    magnesium hydroxide (MILK OF MAGNESIA) 400 mg/5 mL oral suspension 30 mL  30 mL Oral DAILY PRN    ibuprofen (MOTRIN) tablet 400 mg  400 mg Oral Q8H PRN    dicyclomine (BENTYL) capsule 10 mg  10 mg Oral QID       Scheduled Medications:  Current Facility-Administered Medications   Medication Dose Route Frequency    venlafaxine-SR (EFFEXOR-XR) capsule 150 mg  150 mg Oral DAILY WITH BREAKFAST    dicyclomine (BENTYL) capsule 10 mg  10 mg Oral QID         ASSESSMENT/PLAN:       Diagnoses:   Mood disorder unspecified, rule out bipolar II disorder, rule out recurrent major depression, rule out borderline personality disorder. Plan:  Continue meds. Risks and benefits of medications discussed and patient gave verbal informed consent for treatment with medications. I certify that this patients inpatient psychiatric hospital services furnished since the previous certification were, and continue to be, required for treatment that could reasonably be expected to improve the patient's condition, or for diagnostic study, and that the patient continues to need, on a daily basis, active treatment furnished directly by or requiring the supervision of inpatient psychiatric facility personnel. In addition, the hospital records show that services furnished were intensive treatment services, admission or related services, or equivalent services.         Signed By: John Prater MD

## 2019-09-07 NOTE — PROGRESS NOTES
Problem: Depressed Mood (Adult/Pediatric)  Goal: *STG: Participates in treatment plan  Outcome: Progressing Towards Goal  Note:   Pt meeting with treatment team meeting. Discussing plan of care. Pt verbalizes interest in follow up therapy  close to [facility] when discharged. Pt verbalizes understanding of possible discharge Monday. Medication compliant. Denies SI, denies side effects of medications. Pt is encouraged to attend groups and continue to develop coping skills. Pt tolerates well. Pt goal: work on homework; in progress (book and supplies available for pt.)   Pt reports improved mood. Reduction in lability of mood today.       Problem: Depressed Mood (Adult/Pediatric)  Goal: *STG: Verbalizes anger, guilt, and other feelings in a constructive manor  Outcome: Progressing Towards Goal     Problem: Depressed Mood (Adult/Pediatric)  Goal: *STG: Attends activities and groups  Outcome: Progressing Towards Goal     Problem: Depressed Mood (Adult/Pediatric)  Goal: *STG: Demonstrates reduction in symptoms and increase in insight into coping skills/future focused  Outcome: Progressing Towards Goal     Problem: Depressed Mood (Adult/Pediatric)  Goal: Interventions  Outcome: Progressing Towards Goal

## 2019-09-07 NOTE — PROGRESS NOTES
Pt appears to be sleeping. NAD. Respirations even and unlabored. Will continue to monitor q15 for safety. Problem: Falls - Risk of  Goal: *Absence of Falls  Description  Document Donita Man Fall Risk and appropriate interventions in the flowsheet.   Outcome: Progressing Towards Goal  Note:   Fall Risk Interventions:            Medication Interventions: Teach patient to arise slowly

## 2019-09-07 NOTE — BH NOTES
PRN Medication Documentation    Specific patient behavior that led to need for PRN medication: pt reports increased anxiety related to school.  Pt becomes restless during conversations related to stressors , elevated BP   Staff interventions attempted prior to PRN being given: education coping skills  PRN medication given: PO 50 mg Atarax   Patient response/effectiveness of PRN medication: pt alert oriented calm

## 2019-09-07 NOTE — INTERDISCIPLINARY ROUNDS
Behavioral Health Interdisciplinary Rounds     Patient Name: Diamond Naidu  Age: 22 y.o. Room/Bed:  727/02  Primary Diagnosis: <principal problem not specified>   Admission Status: Voluntary     Readmission within 30 days: no  Power of  in place: no  Patient requires a blocked bed: no          Reason for blocked bed:     VTE Prophylaxis: No    Mobility needs/Fall risk: no  Flu Vaccine : no   Nutritional Plan: no  Consults:          Labs/Testing due today?: no    Sleep hours:  6+      Participation in Care/Groups:  yes  Medication Compliant?: Yes  PRNS (last 24 hours): None    Restraints (last 24 hours):  no     CIWA (range last 24 hours): CIWA-Ar Total: 0   COWS (range last 24 hours): COWS Total: 1    Alcohol screening (AUDIT) completed -   AUDIT Score: 2     If applicable, date SBIRT discussed in treatment team AND documented:   AUDIT Screen Score: AUDIT Score: 2      Document Brief Intervention (corresponds directly with the 5 A's, Ask, Advise, Assess, Assist, and Arrange): At- Risk Patients (Score 7-15 for women; 8-15 for men)  Discuss concern patient is drinking at unhealthy levels known to increase risk of alcohol-related health problems. Is Patient ready to commit to change? If No:   Encourage reflection   Discuss short term and long term health risks of consuming alcohol   Barriers to change   Reaffirm willingness to help / Educational materials provided  If Yes:   Set goal  [employer] provided    Harmful use or Dependence (Score 16 or greater)   Discuss short term and long term health risks of consuming alcohol   Recommendations   Negotiate drinking goal   Recommend addiction specialist/center   Arrange follow-up appointments.     Tobacco - patient is a smoker: Have You Used Tobacco in the Past 30 Days: No  Illegal Drugs use: Have You Used Any Illegal Substances Over the Past 12 Months: No    24 hour chart check complete: yes     Patient goal(s) for today:   Treatment team focus/goals:   Progress note     LOS:  2  Expected LOS:     Financial concerns/prescription coverage:    Family contact:       Family requesting physician contact today:    Discharge plan:   Access to weapons :         Outpatient provider(s):   Patient's preferred phone number for follow up call :     Participating treatment team members: Jackelyn Slater, * (assigned SW),

## 2019-09-08 PROCEDURE — 74011250637 HC RX REV CODE- 250/637: Performed by: PSYCHIATRY & NEUROLOGY

## 2019-09-08 PROCEDURE — 65220000003 HC RM SEMIPRIVATE PSYCH

## 2019-09-08 RX ADMIN — DICYCLOMINE HYDROCHLORIDE 10 MG: 10 CAPSULE ORAL at 18:04

## 2019-09-08 RX ADMIN — VENLAFAXINE HYDROCHLORIDE 150 MG: 150 CAPSULE, EXTENDED RELEASE ORAL at 09:16

## 2019-09-08 RX ADMIN — DICYCLOMINE HYDROCHLORIDE 10 MG: 10 CAPSULE ORAL at 09:16

## 2019-09-08 RX ADMIN — DICYCLOMINE HYDROCHLORIDE 10 MG: 10 CAPSULE ORAL at 13:40

## 2019-09-08 RX ADMIN — DICYCLOMINE HYDROCHLORIDE 10 MG: 10 CAPSULE ORAL at 21:49

## 2019-09-08 NOTE — PROGRESS NOTES
Problem: Depressed Mood (Adult/Pediatric)  Goal: *STG: Participates in treatment plan  Outcome: Progressing Towards Goal  Note:   Out on unit social w peers and staff. Mood and affect brighter and engaged. Actively working on coping skills worksheets. Good insight on what goals she would like to set and work with her therapist. Denies SI. Staff focus is on coping skills education and encouraging pt to reach goals.    Goal: *STG: Verbalizes anger, guilt, and other feelings in a constructive manor  Outcome: Progressing Towards Goal  Goal: *STG: Attends activities and groups  Outcome: Progressing Towards Goal  Goal: *STG: Demonstrates reduction in symptoms and increase in insight into coping skills/future focused  Outcome: Progressing Towards Goal  Goal: Interventions  Outcome: Progressing Towards Goal

## 2019-09-08 NOTE — BH NOTES
Psychiatric Follow Up Note        Date: 9/8/2019  Account Number:  [de-identified]  Name: Arslan Wilkerson PROGRESS NOTE:  To include the following:   Coordinated treatment team rounds conducted with patient. Discussions held with nursing staff. Chart reviewed in full including consultant notes, ancillary staff notes, vitals and labs in [facility] EMR reviewed in full. SUBJECTIVE:   Patient reports feeling much better. Slept better. No SI today. Has occasional anxiety. Side Effects:  None reported or admitted to. OBJECTIVE:                   Mental Status exam:   The patient is a young  female who is dressed in casual street clothes. She is calm, pleasant, and cooperative. Speech is otherwise spontaneous and coherent. Affect is reactive and mood is reported as being okay. Denies any suicidal ideation or plan. Denies any perceptual abnormalities. Denies any delusions. Her thought process is logical and goal directed. Cognitively, she is awake and alert, oriented to time, place, and person. Intelligence is average, memory is intact and fund of knowledge is adequate. Insight is fair. Judgment is poor. Pertinent data:  Patient Vitals for the past 8 hrs:   BP Temp Pulse Resp SpO2 Weight   09/08/19 0811 115/82 98 °F (36.7 °C) 91 16 99 % 72.6 kg (160 lb)     No results found for this or any previous visit (from the past 24 hour(s)).     Medications:  Current Facility-Administered Medications   Medication Dose Route Frequency    venlafaxine-SR (EFFEXOR-XR) capsule 150 mg  150 mg Oral DAILY WITH BREAKFAST    OLANZapine (ZyPREXA) tablet 5 mg  5 mg Oral Q6H PRN    haloperidol lactate (HALDOL) injection 5 mg  5 mg IntraMUSCular Q6H PRN    benztropine (COGENTIN) tablet 1 mg  1 mg Oral BID PRN    diphenhydrAMINE (BENADRYL) injection 50 mg  50 mg IntraMUSCular BID PRN    hydrOXYzine HCl (ATARAX) tablet 50 mg  50 mg Oral TID PRN    LORazepam (ATIVAN) injection 1 mg  1 mg IntraMUSCular Q4H PRN    traZODone (DESYREL) tablet 50 mg  50 mg Oral QHS PRN    acetaminophen (TYLENOL) tablet 650 mg  650 mg Oral Q4H PRN    magnesium hydroxide (MILK OF MAGNESIA) 400 mg/5 mL oral suspension 30 mL  30 mL Oral DAILY PRN    ibuprofen (MOTRIN) tablet 400 mg  400 mg Oral Q8H PRN    dicyclomine (BENTYL) capsule 10 mg  10 mg Oral QID       Scheduled Medications:  Current Facility-Administered Medications   Medication Dose Route Frequency    venlafaxine-SR (EFFEXOR-XR) capsule 150 mg  150 mg Oral DAILY WITH BREAKFAST    dicyclomine (BENTYL) capsule 10 mg  10 mg Oral QID         ASSESSMENT/PLAN:       Diagnoses:   Mood disorder unspecified, rule out bipolar II disorder, rule out recurrent major depression, rule out borderline personality disorder. Plan:  Continue meds. Risks and benefits of medications discussed and patient gave verbal informed consent for treatment with medications. I certify that this patients inpatient psychiatric hospital services furnished since the previous certification were, and continue to be, required for treatment that could reasonably be expected to improve the patient's condition, or for diagnostic study, and that the patient continues to need, on a daily basis, active treatment furnished directly by or requiring the supervision of inpatient psychiatric facility personnel. In addition, the hospital records show that services furnished were intensive treatment services, admission or related services, or equivalent services.         Signed By: Renetta Medina MD

## 2019-09-08 NOTE — PROGRESS NOTES
@1514 Pt received on the unit, sitting with peers at the dining room table. Respirations even and unlabored, NAD noted. Per report she tolerated news of not discharging over the weekend. She was able to seek out staff when her anxiety increased to receive PRN atarax. @1043 Pt has multiple visitors, rotating in and out of the unit. Pt's affect is bright and cheerful. Staff to continue q15 minute checks for safety.     Problem: Depressed Mood (Adult/Pediatric)  Goal: *STG: Participates in treatment plan  Outcome: Progressing Towards Goal    Goal: *STG: Demonstrates reduction in symptoms and increase in insight into coping skills/future focused  Outcome: Progressing Towards Goal

## 2019-09-08 NOTE — PROGRESS NOTES
2315: Patient in bed resting with eyes closed. No distress noted. Respirations are even and unlabored. Staff will continue to monitor q15 throughout the shift. Problem: Falls - Risk of  Goal: *Absence of Falls  Description  Document Cherluize Mail Fall Risk and appropriate interventions in the flowsheet.   Outcome: Progressing Towards Goal  Note:   Fall Risk Interventions:            Medication Interventions: Teach patient to arise slowly

## 2019-09-08 NOTE — INTERDISCIPLINARY ROUNDS
Behavioral Health Interdisciplinary Rounds     Patient Name: Manjula Aguilera  Age: 22 y.o. Room/Bed:  727/  Primary Diagnosis: <principal problem not specified>   Admission Status: Voluntary     Readmission within 30 days: no  Power of  in place: no  Patient requires a blocked bed: no          Reason for blocked bed:     VTE Prophylaxis: No    Mobility needs/Fall risk: no  Flu Vaccine : no   Nutritional Plan: no  Consults:          Labs/Testing due today?: no    Sleep hours:  7.5     Participation in Care/Groups:  yes  Medication Compliant?: Yes  PRNS (last 24 hours): Antianxiety    Restraints (last 24 hours):  no     CIWA (range last 24 hours): CIWA-Ar Total: 0   COWS (range last 24 hours): COWS Total: 1    Alcohol screening (AUDIT) completed -   AUDIT Score: 2     If applicable, date SBIRT discussed in treatment team AND documented:   AUDIT Screen Score: AUDIT Score: 2      Document Brief Intervention (corresponds directly with the 5 A's, Ask, Advise, Assess, Assist, and Arrange): At- Risk Patients (Score 7-15 for women; 8-15 for men)  Discuss concern patient is drinking at unhealthy levels known to increase risk of alcohol-related health problems. Is Patient ready to commit to change? If No:   Encourage reflection   Discuss short term and long term health risks of consuming alcohol   Barriers to change   Reaffirm willingness to help / Educational materials provided  If Yes:   Set goal  [employer] provided    Harmful use or Dependence (Score 16 or greater)   Discuss short term and long term health risks of consuming alcohol   Recommendations   Negotiate drinking goal   Recommend addiction specialist/center   Arrange follow-up appointments.     Tobacco - patient is a smoker: Have You Used Tobacco in the Past 30 Days: No  Illegal Drugs use: Have You Used Any Illegal Substances Over the Past 12 Months: No    24 hour chart check complete: yes     Patient goal(s) for today:   Treatment team focus/goals:   Progress note     LOS:  3  Expected LOS:     Financial concerns/prescription coverage:    Family contact:       Family requesting physician contact today:    Discharge plan:   Access to weapons :         Outpatient provider(s):   Patient's preferred phone number for follow up call :     Participating treatment team members: Silvio Wiley, * (assigned SW),

## 2019-09-08 NOTE — GROUP NOTE
IP  GROUP DOCUMENTATION INDIVIDUAL                                                                          Group Therapy Note    Date: September 8    Group Start Time: 0930  Group End Time: 1000  Group Topic: [employer]    [facility] PS[facility]CHI[facility] [place] [address] GERIATRIC [address] [facility]    Charlie Cui  [address] [facility] GROUP DOCUMENTATION GROUP    Group Therapy Note    Attendees: 10  IP [address] [facility] GROUP DOCUMENTATION GROUP    Group Therapy Note    Attendees: 10         Attendance: Attended    Patient's Goal:  To work on coping skills    Interventions/techniques: Supported    Follows Directions:  Followed directions    Interactions: Interacted appropriately    Mental Status: Manic    Behavior/appearance: Attentive    Goals Achieved: Able to manage/cope with feelings      Additional Notes:      Zully Segal

## 2019-09-09 VITALS
HEART RATE: 80 BPM | TEMPERATURE: 98.3 F | HEIGHT: 63 IN | OXYGEN SATURATION: 98 % | WEIGHT: 160 LBS | SYSTOLIC BLOOD PRESSURE: 131 MMHG | RESPIRATION RATE: 16 BRPM | BODY MASS INDEX: 28.35 KG/M2 | DIASTOLIC BLOOD PRESSURE: 90 MMHG

## 2019-09-09 PROCEDURE — 74011250637 HC RX REV CODE- 250/637: Performed by: PSYCHIATRY & NEUROLOGY

## 2019-09-09 RX ORDER — TRAZODONE HYDROCHLORIDE 50 MG/1
50 TABLET ORAL
Qty: 30 TAB | Refills: 0 | Status: SHIPPED | OUTPATIENT
Start: 2019-09-09 | End: 2021-08-03 | Stop reason: ALTCHOICE

## 2019-09-09 RX ORDER — VENLAFAXINE HYDROCHLORIDE 150 MG/1
150 CAPSULE, EXTENDED RELEASE ORAL
Qty: 30 CAP | Refills: 0 | Status: SHIPPED | OUTPATIENT
Start: 2019-09-10 | End: 2021-08-03 | Stop reason: SDUPTHER

## 2019-09-09 RX ORDER — HYDROXYZINE 50 MG/1
50 TABLET, FILM COATED ORAL
Qty: 90 TAB | Refills: 0 | Status: SHIPPED | OUTPATIENT
Start: 2019-09-09 | End: 2021-08-03 | Stop reason: ALTCHOICE

## 2019-09-09 RX ADMIN — VENLAFAXINE HYDROCHLORIDE 150 MG: 150 CAPSULE, EXTENDED RELEASE ORAL at 08:21

## 2019-09-09 RX ADMIN — DICYCLOMINE HYDROCHLORIDE 10 MG: 10 CAPSULE ORAL at 08:21

## 2019-09-09 NOTE — BH NOTES
Behavioral Health Transition Record to Provider    Patient Name: Jackelyn Slater  YOB: 1995  Medical Record Number: [id number]  Date of Admission: 9/5/2019  Date of Discharge: 9/9/2019    Attending Provider: No att. providers found  Discharging Provider: Dr. Lazarus Stabile, MD  To contact this individual call [phone] and ask the  to page. If unavailable, ask to be transferred to [address] [place] [address] Provider on call. [facility] Provider will be available on call 24/7 and during holidays. Primary Care Provider: Nelson Dumas MD    No Known Allergies    Reason for Admission: Patient was admitted for worsening depression and SI following a breakup with her boyfriend. Admission Diagnosis: Depression [F32.9]    * No surgery found *    Results for orders placed or performed during the hospital encounter of 09/05/19   URINE CULTURE HOLD SAMPLE   Result Value Ref Range    Urine culture hold        URINE ON HOLD IN MICROBIOLOGY DEPT FOR 3 DAYS. IF UNPRESERVED URINE IS SUBMITTED, IT CANNOT BE USED FOR ADDITIONAL TESTING AFTER 24 HRS, RECOLLECTION WILL BE REQUIRED.    DRUG SCREEN, URINE   Result Value Ref Range    AMPHETAMINES NEGATIVE  NEG      BARBITURATES NEGATIVE  NEG      BENZODIAZEPINES NEGATIVE  NEG      COCAINE NEGATIVE  NEG      METHADONE NEGATIVE  NEG      OPIATES NEGATIVE  NEG      PCP(PHENCYCLIDINE) NEGATIVE  NEG      THC (TH-CANNABINOL) NEGATIVE  NEG      Drug screen comment (NOTE)    ETHYL ALCOHOL   Result Value Ref Range    ALCOHOL(ETHYL),SERUM <10 <10 MG/DL   CBC WITH AUTOMATED DIFF   Result Value Ref Range    WBC 8.0 3.6 - 11.0 K/uL    RBC 4.77 3.80 - 5.20 M/uL    HGB 15.1 11.5 - 16.0 g/dL    HCT 43.1 35.0 - 47.0 %    MCV 90.4 80.0 - 99.0 FL    MCH 31.7 26.0 - 34.0 PG    MCHC 35.0 30.0 - 36.5 g/dL    RDW 11.7 11.5 - 14.5 %    PLATELET 144 958 - 099 K/uL    MPV 9.8 8.9 - 12.9 FL    NRBC 0.0 0  WBC    ABSOLUTE NRBC 0.00 0.00 - 0.01 K/uL    NEUTROPHILS 72 32 - 75 %    LYMPHOCYTES 22 12 - 49 %    MONOCYTES 6 5 - 13 %    EOSINOPHILS 0 0 - 7 %    BASOPHILS 0 0 - 1 %    IMMATURE GRANULOCYTES 0 0.0 - 0.5 %    ABS. NEUTROPHILS 5.7 1.8 - 8.0 K/UL    ABS. LYMPHOCYTES 1.7 0.8 - 3.5 K/UL    ABS. MONOCYTES 0.5 0.0 - 1.0 K/UL    ABS. EOSINOPHILS 0.0 0.0 - 0.4 K/UL    ABS. BASOPHILS 0.0 0.0 - 0.1 K/UL    ABS. IMM. GRANS. 0.0 0.00 - 0.04 K/UL    DF AUTOMATED     METABOLIC PANEL, COMPREHENSIVE   Result Value Ref Range    Sodium 140 136 - 145 mmol/L    Potassium 3.8 3.5 - 5.1 mmol/L    Chloride 105 97 - 108 mmol/L    CO2 25 21 - 32 mmol/L    Anion gap 10 5 - 15 mmol/L    Glucose 101 (H) 65 - 100 mg/dL    BUN 7 6 - 20 MG/DL    Creatinine 0.78 0.55 - 1.02 MG/DL    BUN/Creatinine ratio 9 (L) 12 - 20      GFR est AA >60 >60 ml/min/1.73m2    GFR est non-AA >60 >60 ml/min/1.73m2    Calcium 9.5 8.5 - 10.1 MG/DL    Bilirubin, total 1.2 (H) 0.2 - 1.0 MG/DL    ALT (SGPT) 31 12 - 78 U/L    AST (SGOT) 23 15 - 37 U/L    Alk.  phosphatase 94 45 - 117 U/L    Protein, total 8.4 (H) 6.4 - 8.2 g/dL    Albumin 4.1 3.5 - 5.0 g/dL    Globulin 4.3 (H) 2.0 - 4.0 g/dL    A-G Ratio 1.0 (L) 1.1 - 2.2     URINALYSIS W/MICROSCOPIC   Result Value Ref Range    Color YELLOW/STRAW      Appearance CLEAR CLEAR      Specific gravity 1.015 1.003 - 1.030      pH (UA) 6.5 5.0 - 8.0      Protein NEGATIVE  NEG mg/dL    Glucose NEGATIVE  NEG mg/dL    Ketone TRACE (A) NEG mg/dL    Bilirubin NEGATIVE  NEG      Blood SMALL (A) NEG      Urobilinogen 1.0 0.2 - 1.0 EU/dL    Nitrites NEGATIVE  NEG      Leukocyte Esterase NEGATIVE  NEG      WBC 0-4 0 - 4 /hpf    RBC 5-10 0 - 5 /hpf    Epithelial cells FEW FEW /lpf    Bacteria NEGATIVE  NEG /hpf    Hyaline cast 0-2 0 - 5 /lpf   HCG QL SERUM   Result Value Ref Range    HCG, Ql. NEGATIVE  NEG     SALICYLATE   Result Value Ref Range    Salicylate level <2.2 (L) 2.8 - 20.0 MG/DL   ACETAMINOPHEN   Result Value Ref Range    Acetaminophen level <2 (L) 10 - 30 ug/mL   HCG URINE, QL. - POC   Result Value Ref Range    Pregnancy test,urine (POC) NEGATIVE  NEG     EKG, 12 LEAD, INITIAL   Result Value Ref Range    Ventricular Rate 101 BPM    Atrial Rate 104 BPM    P-R Interval 128 ms    QRS Duration 84 ms    Q-T Interval 356 ms    QTC Calculation (Bezet) 461 ms    Calculated P Axis 56 degrees    Calculated R Axis 49 degrees    Calculated T Axis 30 degrees    Diagnosis       Sinus tachycardia  No previous ECGs available  Confirmed by Dima Esteves M.D., Rosalina Pena ([id number]) on 9/5/2019 12:08:21 PM         Immunizations administered during this encounter:   Immunization History   Administered Date(s) Administered    DTaP 1995, 1995, 1996, 10/28/1997, 03/15/2001    HPV 07/14/2012, 06/02/2014    HPV (9-valent) 08/16/2017    HPV (Quad) 07/17/2012    Hep A Vaccine 06/28/2007, 07/14/2012    Hep B Vaccine 1996, 1996, 01/17/1997    Hib 1996, 1996, 1996    IPV 1996, 1996, 1996, 03/15/2001    MMR 07/29/1997, 08/15/2001    Meningococcal ACWY Vaccine 06/28/2007, 06/02/2014    Tdap 06/28/2007, 05/16/2017    Varicella Virus Vaccine 04/29/1997, 10/01/2008       Screening for Metabolic Disorders for Patients on Antipsychotic Medications  (Data obtained from the EMR)    Estimated Body Mass Index  Estimated body mass index is 28.34 kg/m² as calculated from the following:    Height as of this encounter: 5' 3\" (1.6 m). Weight as of this encounter: 72.6 kg (160 lb). Vital Signs/Blood Pressure  Visit Vitals  /90 (BP 1 Location: Right arm, BP Patient Position: Sitting)   Pulse 80   Temp 98.3 °F (36.8 °C)   Resp 16   Ht 5' 3\" (1.6 m)   Wt 72.6 kg (160 lb)   LMP 08/26/2019   SpO2 98%   Breastfeeding?  No   BMI 28.34 kg/m²       Blood Glucose/Hemoglobin A1c  Lab Results   Component Value Date/Time    Glucose 101 (H) 09/05/2019 09:50 AM       No results found for: HBA1C, HGBE8, ONK3GDJA     Lipid Panel  No results found for: CHOL, CHOLX, CHLST, CHOLV, 346120, HDL, LDL, LDLC, DLDLP, TGLX, TRIGL, TRIGP, CHHD, CHHDX     Discharge Diagnosis: Depression (ICD-1-CM: F32.9)    Discharge Plan: Patient discharged home into care of family. The patient Elicia Camejo exhibits the ability to control behavior in a less restrictive environment. Patient's level of functioning is improving. No assaultive/destructive behavior has been observed for the past 24 hours. No suicidal/homicidal threat or behavior has been observed for the past 24 hours. There is no evidence of serious medication side effects. Patient has not been in physical or protective restraints for at least the past 24 hours. If weapons involved, how are they secured? NA    Is patient aware of and in agreement with discharge plan? yes    Arrangements for medication:  Prescriptions given to patient. Copy of discharge instructions to provider?:  Juan Carlos Wiseman NP/[facility] (fax: [phone])    Arrangements for transportation home:  Pt's family to  and provide transportation to [facility]. Keep all follow up appointments as scheduled, continue to take prescribed medications per physician instructions. Mental health crisis number:  [phone] or your local mental health crisis line number at [phone]. Discharge Medication List and Instructions:   Discharge Medication List as of 9/9/2019 10:37 AM      START taking these medications    Details   hydrOXYzine HCl (ATARAX) 50 mg tablet Take 1 Tab by mouth three (3) times daily as needed (anxiety). Indications: anxious, Normal, Disp-90 Tab, R-0      traZODone (DESYREL) 50 mg tablet Take 1 Tab by mouth nightly as needed for Sleep (For insomnia). Indications: insomnia associated with depression, Normal, Disp-30 Tab, R-0         CONTINUE these medications which have CHANGED    Details   venlafaxine-SR (EFFEXOR-XR) 150 mg capsule Take 1 Cap by mouth daily (with breakfast).  Indications: major depressive disorder, Normal, Disp-30 Cap, R-0         CONTINUE these medications which have NOT CHANGED    Details   dicyclomine (BENTYL) 10 mg capsule Take 1 Cap by mouth four (4) times daily. , Normal, Disp-40 Cap, R-0      norethindrone ac-eth estradiol (LOESTRIN 1/20, 21, PO) Take 1 Tab by mouth daily. , Historical Med         STOP taking these medications       ibuprofen (ADVIL LIQUI-GEL) 200 mg cap Comments:   Reason for Stopping:               Unresulted Labs (24h ago, onward)    None        To obtain results of studies pending at discharge, please contact [phone]    Follow-up Information     Follow up With Specialties Details Why Contact Info    Abraham Ellison NP  Schedule an appointment as soon as possible for a visit  [facility] [address], [address] [place] [address]  [phone]    Klaudia Arevalo MD Internal Medicine   [address]  [address]  [address] [place] [address]  [phone]      [facility]  Call Call to set up intake appointment for counseling. Ask about DBT counselor availability. [facility] Mora [place]. [facility]  [address] [facility]. [facility], [facility]  phone: [phone]    Sharyle Kief . Burton Regan  Call Call to inquire about if she is taking new patients for DBT. [facility] [address] [facility], [address] [place] [address] Pky   [phone]     [facility]  Call Call to inquire about if she is taking new patients for DBT. [address] [facility],[address]    [address], [address]   [phone]     [facility]  Call Call to inquire about if she is taking new patients for DBT. [address]    [facility] [place], [address] [place], [place] [address]y   [phone]           Advanced Directive:   Does the patient have an appointed surrogate decision maker? No  Does the patient have a Medical Advance Directive? No  Does the patient have a Psychiatric Advance Directive?  No  If the patient does not have a surrogate or Medical Advance Directive AND Psychiatric Advance Directive, the patient was offered information on these advance directives Patient declined to complete    Patient Instructions: Please continue all medications until otherwise directed by physician. Tobacco Cessation Discharge Plan:   Is the patient a smoker and needs referral for smoking cessation? No  Patient referred to the following for smoking cessation with an appointment? Not applicable     Patient was offered medication to assist with smoking cessation at discharge? Not applicable  Was education for smoking cessation added to the discharge instructions? Yes    Alcohol/Substance Abuse Discharge Plan:   Does the patient have a history of substance/alcohol abuse and requires a referral for treatment? No  Patient referred to the following for substance/alcohol abuse treatment with an appointment? Not applicable  Patient was offered medication to assist with alcohol cessation at discharge? Not applicable  Was education for substance/alcohol abuse added to discharge instructions? Not applicable    Patient discharged to Home; discussed with patient/caregiver and provided to the patient/caregiver either in hard copy or electronically.

## 2019-09-09 NOTE — BH NOTES
GROUP THERAPY PROGRESS NOTE    Elicia Camejo participated in the General Unit's Process Group, with a focus identifying feelings and planning for the day. Group time: 2257  0003     Personal goal for participation: To increase the capacity to improve ones mood and structure. Goal orientation: The patient will be able to identify their feelings, develop a plan for structuring their day, and discharge planning. Group therapy participation: With prompting, this patient partially and passively participated in the group, except when called out to finalize her discharge plans with nursing. Therapeutic interventions reviewed and discussed: The group members were asked to introduce themselves to each other and to see if they could identify an emotion they are having and/or let the group know what they want to focus on for the day as they continue to make discharge plans. Impression of participation: The patient sat quietly and silently through the first half of the group. She appeared alert, generally oriented, calm, and smiling. She expressed no current SI/HI and displayed no overt psychotic symptoms. She was called out of group to meet with nursing to finalize her discharge plans. Her affect was non-dysphoric and mildly euphoric. Her mood matched her affect.

## 2019-09-09 NOTE — DISCHARGE INSTRUCTIONS
DISCHARGE SUMMARY    NAME:Genoveva Maloney  : 1995  MRN: [id number]    The patient Jennifer Becker exhibits the ability to control behavior in a less restrictive environment. Patient's level of functioning is improving. No assaultive/destructive behavior has been observed for the past 24 hours. No suicidal/homicidal threat or behavior has been observed for the past 24 hours. There is no evidence of serious medication side effects. Patient has not been in physical or protective restraints for at least the past 24 hours. If weapons involved, how are they secured? NA    Is patient aware of and in agreement with discharge plan? yes    Arrangements for medication:  Prescriptions given to patient. Copy of discharge instructions to provider?:  Jake Ling NP/[facility] (fax: [phone])    Arrangements for transportation home:  Pt's family to  and provide transportation to [facility]. Keep all follow up appointments as scheduled, continue to take prescribed medications per physician instructions. Mental health crisis number:  572 or your local mental health crisis line number at [phone]. DISCHARGE SUMMARY from Nurse    PATIENT INSTRUCTIONS:    What to do at Home:  Recommended activity: Activity as tolerated,     If you experience any of the following symptoms thoughts of harming self, feeling overwhelmed with life's stressors and hoplessness, please follow up with Jake Ling NP. Once you have an established relationship with your newly assigned therapist incorporate them into your support system and crisis plan. .    *  Please give a list of your current medications to your Primary Care Provider. *  Please update this list whenever your medications are discontinued, doses are      changed, or new medications (including over-the-counter products) are added. *  Please carry medication information at all times in case of emergency situations.     These are general instructions for a healthy lifestyle:    No smoking/ No tobacco products/ Avoid exposure to second hand smoke  Surgeon General's Warning:  Quitting smoking now greatly reduces serious risk to your health. Obesity, smoking, and sedentary lifestyle greatly increases your risk for illness    A healthy diet, regular physical exercise & weight monitoring are important for maintaining a healthy lifestyle    You may be retaining fluid if you have a history of heart failure or if you experience any of the following symptoms:  Weight gain of 3 pounds or more overnight or 5 pounds in a week, increased swelling in our hands or feet or shortness of breath while lying flat in bed. Please call your doctor as soon as you notice any of these symptoms; do not wait until your next office visit. The discharge information has been reviewed with the patient. The patient verbalized understanding. Discharge medications reviewed with the patient and appropriate educational materials and side effects teaching were provided.   ___________________________________________________________________________________________________________________________________

## 2019-09-09 NOTE — PROGRESS NOTES
Pharmacist Discharge Medication Reconciliation    Discharging Provider: Dr. Raissa West PMH:   Past Medical History:   Diagnosis Date    Depression     Heart abnormalities     WPW  SVT    Ill-defined condition     IBS    Suicidal thoughts      Chief Complaint for this Admission:   Chief Complaint   Patient presents with    Mental Health Problem     Allergies: Patient has no known allergies. Discharge Medications:   Current Discharge Medication List        START taking these medications    Details   hydrOXYzine HCl (ATARAX) 50 mg tablet Take 1 Tab by mouth three (3) times daily as needed (anxiety). Indications: anxious  Qty: 90 Tab, Refills: 0      traZODone (DESYREL) 50 mg tablet Take 1 Tab by mouth nightly as needed for Sleep (For insomnia). Indications: insomnia associated with depression  Qty: 30 Tab, Refills: 0           CONTINUE these medications which have CHANGED    Details   venlafaxine-SR (EFFEXOR-XR) 150 mg capsule Take 1 Cap by mouth daily (with breakfast). Indications: major depressive disorder  Qty: 30 Cap, Refills: 0           CONTINUE these medications which have NOT CHANGED    Details   dicyclomine (BENTYL) 10 mg capsule Take 1 Cap by mouth four (4) times daily. Qty: 40 Cap, Refills: 0    Associated Diagnoses: Irritable bowel syndrome with both constipation and diarrhea      norethindrone ac-eth estradiol (LOESTRIN 1/20, 21, PO) Take 1 Tab by mouth daily.            STOP taking these medications       ibuprofen (ADVIL LIQUI-GEL) 200 mg cap Comments:   Reason for Stopping:             The patient's chart, MAR and AVS were reviewed by Stephanie Hopper PHARMD.

## 2019-09-09 NOTE — PROGRESS NOTES
Pt lying in bed with eyes closed, appears to be sleeping. NAD. Respirations even and unlabored. Will continue to monitor q15 for safety    Problem: Falls - Risk of  Goal: *Absence of Falls  Description  Document Candida Lightning Fall Risk and appropriate interventions in the flowsheet.   Outcome: Progressing Towards Goal  Note:   Fall Risk Interventions:            Medication Interventions: Teach patient to arise slowly

## 2019-09-09 NOTE — INTERDISCIPLINARY ROUNDS
Behavioral Health Interdisciplinary Rounds     Patient Name: Vance Pérez  Age: 22 y.o. Room/Bed:  727/  Primary Diagnosis: <principal problem not specified>   Admission Status: Voluntary     Readmission within 30 days: no  Power of  in place: no  Patient requires a blocked bed: no          Reason for blocked bed:     VTE Prophylaxis: No    Mobility needs/Fall risk: no  Flu Vaccine : no   Nutritional Plan: no  Consults:          Labs/Testing due today?: no    Sleep hours:  6+      Participation in Care/Groups:  yes  Medication Compliant?: Yes  PRNS (last 24 hours): None    Restraints (last 24 hours):  no     CIWA (range last 24 hours): CIWA-Ar Total: 0   COWS (range last 24 hours): COWS Total: 1    Alcohol screening (AUDIT) completed -   AUDIT Score: 2     If applicable, date SBIRT discussed in treatment team AND documented:   AUDIT Screen Score: AUDIT Score: 2      Document Brief Intervention (corresponds directly with the 5 A's, Ask, Advise, Assess, Assist, and Arrange): At- Risk Patients (Score 7-15 for women; 8-15 for men)  Discuss concern patient is drinking at unhealthy levels known to increase risk of alcohol-related health problems. Is Patient ready to commit to change? If No:   Encourage reflection   Discuss short term and long term health risks of consuming alcohol   Barriers to change   Reaffirm willingness to help / Educational materials provided  If Yes:   Set goal  Webcrunch provided    Harmful use or Dependence (Score 16 or greater)   Discuss short term and long term health risks of consuming alcohol   Recommendations   Negotiate drinking goal   Recommend addiction specialist/center   Arrange follow-up appointments.     Tobacco - patient is a smoker: Have You Used Tobacco in the Past 30 Days: No  Illegal Drugs use: Have You Used Any Illegal Substances Over the Past 12 Months: No    24 hour chart check complete: yes     Patient goal(s) for today: Discharge  Treatment team focus/goals: Reconcile medications and finalize postdischarge follow up. Progress note: Pt is alert, oriented, calm, cooperative and asking to discharge. LOS:  4  Expected LOS: 4    Financial concerns/prescription coverage:  [place]  Family contact: RN spoke with family today, 9/6/19                            Family requesting physician contact today:  no  Discharge plan: return to apartment in [place], [place]  Access to weapons :   No                                  Outpatient provider(s): Abraham Ellison NP  Patient's preferred phone number for follow up call: [phone]      Participating treatment team members: Dr. dEd Abdullahi MD; Nilay Fisher, RN; Edis Abdullahi, PharmD;  ANDRES Tellez

## 2019-09-09 NOTE — DISCHARGE SUMMARY
Some parts of the discharge summary are from the initial Psychiatric interview that was done on admission by the admitting psychiatrist.     Date of Admission: 9/5/2019    Date of Discharge: 9/9/2019     TYPE OF DISCHARGE:   REGULAR -  YES  AMA  RELEASED BY THE [facility] COURT    CHIEF COMPLAINT:  \"I feel better and want to go home. \"     HISTORY OF PRESENT ILLNESS:  The patient is a 50-year-old  female who is currently admitted with a history of expressing suicide. She had told to a friend of hers that she wanted to drive off the road. She states that she broke up with her boyfriend 2 days ago after she found out that he was cheating on her. She had expressed suicidal thoughts to him also. When asked about it, she says that she has no intention of committing suicide and that she was making suicidal threats because she just wanted attention and she wanted her boyfriend to feel bad. There is a history of cutting according to the EMR but she denies this. States that when she was younger she used her thumbs to make marks on her arms but has never cut herself. Denies use of alcohol or other substances and urine drug screen was negative. States that her mood is good today and she is requesting to be discharged. Denies any psychotic symptoms at the present time.     PAST MEDICAL HISTORY:  Reviewed as per the history and physical exam.     Past Medical History:   Diagnosis Date    Depression     Heart abnormalities     WPW  SVT    Ill-defined condition     IBS    Suicidal thoughts       Prior to Admission medications    Medication Sig Start Date End Date Taking? Authorizing Provider   venlafaxine-SR [facility]) 150 mg capsule Take 1 Cap by mouth daily (with breakfast). Indications: major depressive disorder 9/10/19  Yes Raimundo Hamilton MD   hydrOXYzine HCl (ATARAX) 50 mg tablet Take 1 Tab by mouth three (3) times daily as needed (anxiety).  Indications: anxious 9/9/19  Yes Raimundo Hamilton MD   traZODone (DESYREL) 50 mg tablet Take 1 Tab by mouth nightly as needed for Sleep (For insomnia). Indications: insomnia associated with depression 9/9/19  Yes Raimundo Hamilton MD   ibuprofen (ADVIL LIQUI-GEL) 200 mg cap Take 400 mg by mouth daily as needed for Pain. Yes Provider, Historical   venlafaxine-SR (EFFEXOR XR) 75 mg capsule Take 75 mg by mouth daily. Yes Provider, Historical   dicyclomine (BENTYL) 10 mg capsule Take 1 Cap by mouth four (4) times daily. 7/12/19  Yes Amanuel Parrish NP   norethindrone ac-eth estradiol (LOESTRIN 1/20, 21, PO) Take 1 Tab by mouth daily. Yes Provider, Historical      Lab Results   Component Value Date/Time    Sodium 140 09/05/2019 09:50 AM    Potassium 3.8 09/05/2019 09:50 AM    Chloride 105 09/05/2019 09:50 AM    CO2 25 09/05/2019 09:50 AM    Anion gap 10 09/05/2019 09:50 AM    Glucose 101 (H) 09/05/2019 09:50 AM    BUN 7 09/05/2019 09:50 AM    Creatinine 0.78 09/05/2019 09:50 AM    BUN/Creatinine ratio 9 (L) 09/05/2019 09:50 AM    GFR est AA >60 09/05/2019 09:50 AM    GFR est non-AA >60 09/05/2019 09:50 AM    Calcium 9.5 09/05/2019 09:50 AM    Bilirubin, total 1.2 (H) 09/05/2019 09:50 AM    AST (SGOT) 23 09/05/2019 09:50 AM    Alk.  phosphatase 94 09/05/2019 09:50 AM    Protein, total 8.4 (H) 09/05/2019 09:50 AM    Albumin 4.1 09/05/2019 09:50 AM    Globulin 4.3 (H) 09/05/2019 09:50 AM    A-G Ratio 1.0 (L) 09/05/2019 09:50 AM    ALT (SGPT) 31 09/05/2019 09:50 AM     Lab Results   Component Value Date/Time    WBC 8.0 09/05/2019 09:50 AM    HGB 15.1 09/05/2019 09:50 AM    HCT 43.1 09/05/2019 09:50 AM    PLATELET 088 49/36/2604 09:50 AM    MCV 90.4 09/05/2019 09:50 AM     Vitals:    09/08/19 0811 09/08/19 1602 09/08/19 2000 09/09/19 0740   BP: 115/82 106/71 125/67 131/90   Pulse: 91 87 96 80   Resp: 16 18 18 16   Temp: 98 °F (36.7 °C) 98.1 °F (36.7 °C) 98.5 °F (36.9 °C) 98.3 °F (36.8 °C)   SpO2: 99% 98% 98% 98%   Weight: 72.6 kg (160 lb)      Height:       LMP: 08/26/2019        PAST PSYCHIATRIC HISTORY:  The patient reports that she has been in psychiatric treatment and on antidepressants for several years now. She had previously been treated with Lexapro which she felt was not helping and was switched to Effexor recently. She sees Concepcion Sinha who is a nurse practitioner for her medications and also sees a therapist.  Denies any prior psychiatric hospitalizations or suicide attempts.     PSYCHOSOCIAL HISTORY:  The patient is [place] native and was born and brought up here. 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 She is single at the present time and recently broke up with her boyfriend. She has never been  and does not have any children. Currently, she is unemployed and her parents support her. Denies any major legal stressors.     MENTAL STATUS EXAMINATION:  The patient is a young  female who is dressed in casual street clothes. She is calm, pleasant, and cooperative initially during the interview but on being told that she could not be discharged today, she became somewhat agitated, started crying and talking loudly. However, she could be redirected from this. Speech is otherwise spontaneous and coherent. Affect is reactive and mood is reported as being okay. Denies any suicidal ideation or plan. Denies any perceptual abnormalities. Denies any delusions. Her thought process is logical and goal directed. Cognitively, she is awake and alert, oriented to time, place, and person. Intelligence is average, memory is intact and fund of knowledge is adequate. Insight is fair. Judgment is poor.     ASSESSMENT AND PLAN/DIAGNOSES:  Mood disorder unspecified, rule out bipolar II disorder, rule out recurrent major depression, rule out borderline personality disorder.     COURSE IN THE HOSPITAL:  Patient was admitted to the inpatient psychiatry unit for acute psychiatric stabilization in regards to symptomatology as described in the HPI above and placed on Q15 minute checks and withdrawal precautions. While on the unit Jackelyn Slater was involved in individual, group, occupational and milieu therapy. She was started back on her usual medication regimen as well as PRN medications including Effexor which was increased to 150mg, Trazodone for sleep, and Vistaril for anxiety. She improved gradually and was able to integrate into the milieu with help from the nursing staff. Patients symptoms improved gradually including labile moods, poor motivation, suicidal ideation. She was quite on the unit, appropriate in her interactions, and cooperative with medications and the unit routine. Please see individual progress notes for more specific details regarding patient's hospitalization course. Patient was discharged as per the plan. She had been doing well on the unit as per the report of the nursing staff and my observations. No PRN medication for agitation, seclusion or restraints were required during the last 48 hours of her stay. Jackelyn Slater had improved progressively to the point of being stable for discharge and outpatient FU. At this time she did not offer any complaints. Patient denied any SI or HI. Denied any AH or VH. She denied any delusions. Was not considered a danger to self or to others and is safe for discharge. Will FU with her appointments and remains motivated to be in treatment. The patient verbalized understanding of her discharge instructions. DISCHARGE DIAGNOSIS:  Mood Disorder Unspecified. MENTAL STATUS EXAM ON DISCHARGE:    General appearance:   Jackelyn Slater is a 22 y.o. WHITE OR  female who is well groomed, psychomotor activity is WNL  Eye contact: makes good eye contact  Speech: Spontaneous and coherent  Affect : Euthymic  Mood: \"OK\"  Thought Process: Logical, goal directed  Perception: Denies any AH or VH.    Thought Content: Denies any SI or Plan  Insight: Partial  Judgement: Fair  Cognition: Intact grossly. Current Discharge Medication List      START taking these medications    Details   hydrOXYzine HCl (ATARAX) 50 mg tablet Take 1 Tab by mouth three (3) times daily as needed (anxiety). Indications: anxious  Qty: 90 Tab, Refills: 0      traZODone (DESYREL) 50 mg tablet Take 1 Tab by mouth nightly as needed for Sleep (For insomnia). Indications: insomnia associated with depression  Qty: 30 Tab, Refills: 0         CONTINUE these medications which have CHANGED    Details   venlafaxine-SR (EFFEXOR-XR) 150 mg capsule Take 1 Cap by mouth daily (with breakfast). Indications: major depressive disorder  Qty: 30 Cap, Refills: 0         CONTINUE these medications which have NOT CHANGED    Details   dicyclomine (BENTYL) 10 mg capsule Take 1 Cap by mouth four (4) times daily. Qty: 40 Cap, Refills: 0    Associated Diagnoses: Irritable bowel syndrome with both constipation and diarrhea      norethindrone ac-eth estradiol (LOESTRIN 1/20, 21, PO) Take 1 Tab by mouth daily. STOP taking these medications       ibuprofen (ADVIL LIQUI-GEL) 200 mg cap Comments:   Reason for Stopping: Follow-up Information     Follow up With Specialties Details Why Contact Info    Jake Ling NP    [address] [facility], [address] [facility]  [phone]    Dionte Hale MD Internal Medicine   [address] [place] [address] 250  [address] [facility]  [phone]          WOUND CARE: none needed. PROGNOSIS:   Good / Fair based on nature of patient's pathology/ies and treatment compliance issues. Prognosis is greatly dependent upon patient's ability to  follow up on psychiatric/psychotherapy appointments as well as to comply with psychiatric medications as prescribed.

## 2019-09-09 NOTE — BH NOTES
GROUP THERAPY PROGRESS NOTE    Tyrell Corrales is participating in [place]. Group time: 30 minutes    Personal goal for participation: \"make the same changes I have made here outside of here too\"    Goal orientation: community    Group therapy participation: active    Therapeutic interventions reviewed and discussed: Community/ Stretching    Impression of participation: Patient participated fully and was engaged the whole group.

## 2019-09-09 NOTE — PROGRESS NOTES
Problem: Depressed Mood (Adult/Pediatric)  Goal: *STG: Participates in treatment plan  Outcome: Progressing Towards Goal  Pt has participated in therapeutic activities. She has better insight in regard to her actions that led up to hospitalization. Less blaming. No acting out behaviors. More focused therapeutic assignments provided by staff.

## 2019-09-11 ENCOUNTER — PATIENT OUTREACH (OUTPATIENT)
Dept: INTERNAL MEDICINE CLINIC | Age: 24
End: 2019-09-11

## 2019-09-11 NOTE — PROGRESS NOTES
Hospital Discharge Follow-Up      Date/Time:  2019 1:41 PM    Patient was admitted to [facility] on 2019 and discharged on 2019 for depression. The physician discharge summary was available at the time of outreach. Patient was contacted within 2 business days of discharge. Method of communication with provider :none needed at this time. Nurse Navigator (NN) contacted the patient by telephone to perform post hospital discharge assessment. Verified name and  with patient as identifiers. Provided introduction to self, and explanation of the Nurse Navigator role. Disease Specific:   N/A    Barriers to care? None at this time. Advance Care Planning:   Does patient have an Advance Directive:  not on file     Medication(s):   New Medications at Discharge: hydrOXYzine HCl 50 mg tablet (ATARAX)  traZODone 50 mg tablet (DESYREL)  Changed Medications at Discharge: venlafaxine- mg capsule (EFFEXOR-XR)  Discontinued Medications at Discharge: ADVIL LIQUI- mg Cap    Medication reconciliation was not completed at this time. Referral to Pharm D needed: no     Current Outpatient Medications   Medication Sig    venlafaxine-SR (EFFEXOR-XR) 150 mg capsule Take 1 Cap by mouth daily (with breakfast). Indications: major depressive disorder    hydrOXYzine HCl (ATARAX) 50 mg tablet Take 1 Tab by mouth three (3) times daily as needed (anxiety). Indications: anxious    traZODone (DESYREL) 50 mg tablet Take 1 Tab by mouth nightly as needed for Sleep (For insomnia). Indications: insomnia associated with depression    dicyclomine (BENTYL) 10 mg capsule Take 1 Cap by mouth four (4) times daily.  norethindrone ac-eth estradiol (LOESTRIN , 21, PO) Take 1 Tab by mouth daily. No current facility-administered medications for this visit. There are no discontinued medications.     [facility] follow up appointment(s):   Future Appointments   Date Time Provider Nicky Brown 9/20/2019  9:15 AM Marck Munoz MD [address]      Non-[facility] follow up appointment(s): Scheduled. [facility]:  n/a       Goals        Post Hospitalization     Attends follow-up appointments as directed. 9/11/2019 Patient has all follow up appointments scheduled and she will attend.  PAC

## 2019-09-20 ENCOUNTER — OFFICE VISIT (OUTPATIENT)
Dept: INTERNAL MEDICINE CLINIC | Age: 24
End: 2019-09-20

## 2019-09-20 VITALS
HEIGHT: 63 IN | DIASTOLIC BLOOD PRESSURE: 83 MMHG | TEMPERATURE: 98.4 F | RESPIRATION RATE: 16 BRPM | OXYGEN SATURATION: 98 % | BODY MASS INDEX: 27.46 KG/M2 | HEART RATE: 88 BPM | WEIGHT: 155 LBS | SYSTOLIC BLOOD PRESSURE: 122 MMHG

## 2019-09-20 DIAGNOSIS — F41.9 ANXIETY AND DEPRESSION: Primary | ICD-10-CM

## 2019-09-20 DIAGNOSIS — F32.A ANXIETY AND DEPRESSION: Primary | ICD-10-CM

## 2019-09-20 DIAGNOSIS — F51.02 ADJUSTMENT INSOMNIA: ICD-10-CM

## 2019-09-20 NOTE — PROGRESS NOTES
HISTORY OF PRESENT ILLNESS  Derrick Tinajero is a 22 y.o. female. HPI  This is a transition of care visit for Daniel Figueroa, who was hospitalized September 5th-September 9th at [facility] for suicidal thoughts with depression. She had broken up with her boyfriend two days prior and was having thoughts of self harm. In the hospital her medicines were adjusted, Effexor was increased to 150, she was given Trazodone 50 at night and Vistaril 50 mg for anxiety. Since discharge she notes she is about 50% improved. She has seen Anthony Gomes, her NP, for medications. She admits to me she has not been taking either the Hydroxyzine or Trazodone, but that she has had ongoing trouble with waking at 2:00 a.m. and feeling sad and lonely. She has not had any thoughts of self harm or cutting. She would like to start her dog as an emotional support animal.  She is going to [employer] for grad school and classes are good. She has more trouble when by herself. She is working on getting in with a therapist in the [place] area. She is taking the Effexor faithfully. Review of Systems   Constitutional: Negative for malaise/fatigue and weight loss. Gastrointestinal: Negative for abdominal pain, nausea and vomiting. Psychiatric/Behavioral: Negative for depression, hallucinations, substance abuse and suicidal ideas. The patient has insomnia. The patient is not nervous/anxious. Physical Exam   Constitutional: She is oriented to person, place, and time. She appears well-developed and well-nourished. HENT:   Head: Normocephalic and atraumatic. Neck: Normal range of motion. Neck supple. Carotid bruit is not present. No thyromegaly present. Cardiovascular: Normal rate, regular rhythm, S1 normal, S2 normal, normal heart sounds and intact distal pulses. No murmur heard. Pulmonary/Chest: Effort normal and breath sounds normal. No respiratory distress. She has no wheezes. She has no rales.    Musculoskeletal: She exhibits no edema. Neurological: She is alert and oriented to person, place, and time. Psychiatric: She has a normal mood and affect. Her behavior is normal.   Nursing note and vitals reviewed. ASSESSMENT and PLAN  Diagnoses and all orders for this visit:    1. Anxiety and depression    2.  Adjustment insomnia    Improved from recent hospital stay  Encouraged to use the trazodone qhs for sleep  Names of therapists provided  Cont with irina tracey for meds

## 2019-10-01 ENCOUNTER — PATIENT OUTREACH (OUTPATIENT)
Dept: INTERNAL MEDICINE CLINIC | Age: 24
End: 2019-10-01

## 2021-08-03 ENCOUNTER — OFFICE VISIT (OUTPATIENT)
Dept: INTERNAL MEDICINE CLINIC | Age: 26
End: 2021-08-03
Payer: COMMERCIAL

## 2021-08-03 VITALS
RESPIRATION RATE: 18 BRPM | BODY MASS INDEX: 28.21 KG/M2 | TEMPERATURE: 98.5 F | DIASTOLIC BLOOD PRESSURE: 88 MMHG | HEART RATE: 89 BPM | WEIGHT: 159.2 LBS | SYSTOLIC BLOOD PRESSURE: 118 MMHG | HEIGHT: 63 IN | OXYGEN SATURATION: 98 %

## 2021-08-03 DIAGNOSIS — F41.9 ANXIETY AND DEPRESSION: Primary | ICD-10-CM

## 2021-08-03 DIAGNOSIS — F32.A ANXIETY AND DEPRESSION: Primary | ICD-10-CM

## 2021-08-03 PROCEDURE — 99213 OFFICE O/P EST LOW 20 MIN: CPT | Performed by: INTERNAL MEDICINE

## 2021-08-03 RX ORDER — AMOXICILLIN 500 MG/1
TABLET, FILM COATED ORAL
COMMUNITY
Start: 2021-07-29 | End: 2021-11-01 | Stop reason: ALTCHOICE

## 2021-08-03 RX ORDER — SODIUM FLUORIDE 6 MG/ML
PASTE, DENTIFRICE DENTAL
COMMUNITY
Start: 2021-07-05

## 2021-08-03 RX ORDER — VENLAFAXINE HYDROCHLORIDE 150 MG/1
150 CAPSULE, EXTENDED RELEASE ORAL
Qty: 90 CAPSULE | Refills: 1 | Status: SHIPPED
Start: 2021-08-03 | End: 2021-10-15 | Stop reason: DRUGHIGH

## 2021-08-03 RX ORDER — TRETINOIN 0.25 MG/G
CREAM TOPICAL
COMMUNITY
Start: 2021-07-16

## 2021-08-03 RX ORDER — SULFACETAMIDE SODIUM, SULFUR 98; 48 MG/G; MG/G
LIQUID TOPICAL
COMMUNITY
Start: 2021-07-16 | End: 2021-11-01 | Stop reason: ALTCHOICE

## 2021-08-03 NOTE — PROGRESS NOTES
HISTORY OF PRESENT ILLNESS  Macey De Oliveira is a 23 y.o. female. HPI  I have not seen Kalli Amrit in some time. She comes in for me to resume medication as she no longer is seeing a psychiatrist.  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 Has a roommate. Is enjoying weekly dance classes and is working with a therapist. Has also had a self  and feels she is in a great place. Tolerates the Effexor 150, no side effects. Review of Systems   Constitutional: Positive for weight loss. Negative for malaise/fatigue. Gastrointestinal: Negative for abdominal pain. Psychiatric/Behavioral: Negative for depression. The patient is not nervous/anxious and does not have insomnia. Physical Exam  Vitals and nursing note reviewed. Constitutional:       Appearance: She is well-developed. Neurological:      Mental Status: She is alert and oriented to person, place, and time. Psychiatric:         Behavior: Behavior normal.         Thought Content: Thought content normal.         Judgment: Judgment normal.         ASSESSMENT and PLAN  Diagnoses and all orders for this visit:    1. Anxiety and depression  -     venlafaxine-SR (EFFEXOR-XR) 150 mg capsule; Take 1 Capsule by mouth daily (with breakfast).  Indications: major depressive disorder    Obtain pap from her gyn  appt in 6mo

## 2021-08-03 NOTE — PROGRESS NOTES
Soraya Clark  Identified pt with two pt identifiers(name and ). Chief Complaint   Patient presents with    Medication Refill     pt is presenting today for medication eval and refill        1. Have you been to the ER, urgent care clinic since your last visit? Hospitalized since your last visit? NO    2. Have you seen or consulted any other health care providers outside of the [address] [place] [address] since your last visit? Include any pap smears or colon screening. NO      Provider notified of reason for visit, vitals and flowsheets obtained on patients.      Patient received paperwork for advance directive during previous visit but has not completed at this time     Reviewed record In preparation for visit, huddled with provider and have obtained necessary documentation      Health Maintenance Due   Topic    Hepatitis C Screening     PAP AKA CERVICAL CYTOLOGY        Wt Readings from Last 3 Encounters:   21 159 lb 3.2 oz (72.2 kg)   19 155 lb (70.3 kg)   19 161 lb (73 kg)     Temp Readings from Last 3 Encounters:   21 98.5 °F (36.9 °C) (Oral)   19 98.4 °F (36.9 °C) (Oral)   19 99 °F (37.2 °C) (Oral)     BP Readings from Last 3 Encounters:   21 118/88   19 122/83   19 110/82     Pulse Readings from Last 3 Encounters:   21 89   19 88   19 81     Vitals:    21 1102   BP: 118/88   Pulse: 89   Resp: 18   Temp: 98.5 °F (36.9 °C)   TempSrc: Oral   SpO2: 98%   Weight: 159 lb 3.2 oz (72.2 kg)   Height: 5' 3\" (1.6 m)   PainSc:   0 - No pain   LMP: 2021         Learning Assessment:  :     Learning Assessment 2017   PRIMARY LEARNER Patient   PRIMARY LANGUAGE ENGLISH   LEARNER PREFERENCE PRIMARY LISTENING   ANSWERED BY Patient   RELATIONSHIP SELF       Depression Screening:  :     3 most recent PHQ Screens 2019   Little interest or pleasure in doing things Not at all   Feeling down, depressed, irritable, or hopeless Not at all   Total Score PHQ 2 0       Fall Risk Assessment:  :     Fall Risk Assessment, last 12 mths 8/13/2019   Able to walk? Yes   Fall in past 12 months? No       Abuse Screening:  :     Abuse Screening Questionnaire 8/13/2019 3/27/2019   Do you ever feel afraid of your partner? N N   Are you in a relationship with someone who physically or mentally threatens you? N N   Is it safe for you to go home? Y Y       ADL Screening:  :     No flowsheet data found. Medication reconciliation up to date and corrected with patient at this time.

## 2021-09-28 NOTE — PROGRESS NOTES
Problem: Depressed Mood (Adult/Pediatric)  Goal: *STG: Participates in treatment plan  Outcome: Progressing Towards Goal  Note:   Out on unit social w peers and staff. Mood and affect brighter and engaged. Hopeful and anticipating her d/c and beginning therapy. Daily goal is to d/c home.  Staff focus is on d/c planning  Goal: *STG: Verbalizes anger, guilt, and other feelings in a constructive manor  Outcome: Progressing Towards Goal  Goal: *STG: Attends activities and groups  Outcome: Progressing Towards Goal  Goal: *STG: Demonstrates reduction in symptoms and increase in insight into coping skills/future focused  Outcome: Progressing Towards Goal  Goal: Interventions  Outcome: Progressing Towards Goal DISCHARGE

## 2021-10-13 ENCOUNTER — TELEPHONE (OUTPATIENT)
Dept: INTERNAL MEDICINE CLINIC | Age: 26
End: 2021-10-13

## 2021-10-13 NOTE — TELEPHONE ENCOUNTER
V/m left for patient notifying per MD she needs to titrate off the Effexor. Advised PCP would like to do a virtual visit to discuss how to safely titrate off the medicine as she cannot stop taking this medicine cold turkey. Office number left for patient to call back to schedule a virtual visit.

## 2021-10-13 NOTE — TELEPHONE ENCOUNTER
Patient states she is newly pregnant & she questions if she can still take Effexor 150 mg daily or does it need to be d/c? That is the only medicine she is taking. Please advise.

## 2021-10-13 NOTE — TELEPHONE ENCOUNTER
Patient is calling to ask about medications - she just had a positive pregnancy test and wants to know if she should continue taking the medications she's been prescribed. Please call patient back when available.

## 2021-10-15 ENCOUNTER — VIRTUAL VISIT (OUTPATIENT)
Dept: INTERNAL MEDICINE CLINIC | Age: 26
End: 2021-10-15
Payer: COMMERCIAL

## 2021-10-15 DIAGNOSIS — F32.A ANXIETY AND DEPRESSION: Primary | ICD-10-CM

## 2021-10-15 DIAGNOSIS — Z34.91 PREGNANT AND NOT YET DELIVERED IN FIRST TRIMESTER: ICD-10-CM

## 2021-10-15 DIAGNOSIS — F41.9 ANXIETY AND DEPRESSION: Primary | ICD-10-CM

## 2021-10-15 PROCEDURE — 99213 OFFICE O/P EST LOW 20 MIN: CPT | Performed by: INTERNAL MEDICINE

## 2021-10-15 RX ORDER — VENLAFAXINE HYDROCHLORIDE 37.5 MG/1
CAPSULE, EXTENDED RELEASE ORAL
Qty: 60 CAPSULE | Refills: 0 | Status: SHIPPED | OUTPATIENT
Start: 2021-10-15 | End: 2021-11-07

## 2021-10-15 NOTE — PROGRESS NOTES
Consent: Chacorta Shanks, who was seen by synchronous (real-time) audio-video technology, and/or her healthcare decision maker, is aware that this patient-initiated, Telehealth encounter on 10/15/2021 is a billable service, with coverage as determined by her insurance carrier. She is aware that she may receive a bill and has provided verbal consent to proceed: YES  712  Subjective:   Chacorta Shanks is a 23 y.o. female who was seen for Medication Evaluation      She has been stable on effexor 150 mg every day but now just newly discovered pregnant-only missed menses a few days ago home test positive. Emotionally stable no dark  Thoughts and no anxiety. Sees ob next week-boyfriend is supportive. Taking prenatal vit. Current Outpatient Medications   Medication Sig    venlafaxine-SR (EFFEXOR-XR) 37.5 mg capsule Take 2 po  Every day or 75 mg for 5 days and then 1 po every day until appt    sulfacetamide sodium-sulfur 9.8-4.8 % clsr     tretinoin (RETIN-A) 0.025 % topical cream     fluoride, sodium, 1.1 % pste     amoxicillin 500 mg tab TAKE 1 TABLET BY MOUTH TWICE A DAY FOR 10 DAYS    dicyclomine (BENTYL) 10 mg capsule Take 1 Cap by mouth four (4) times daily. (Patient not taking: Reported on 8/3/2021)    norethindrone ac-eth estradiol (LOESTRIN 1/20, 21, PO) Take 1 Tab by mouth daily. (Patient not taking: Reported on 8/3/2021)     No current facility-administered medications for this visit. No Known Allergies    Past Medical History:   Diagnosis Date    Depression     Heart abnormalities     WPW  SVT    Ill-defined condition     IBS    Suicidal thoughts        ROS  All other systems reviewed and negative, unless mentioned in HPI    Objective:   Vital Signs: (As obtained by patient/caregiver at home)  There were no vitals taken for this visit.      [INSTRUCTIONS:  \"[x]\" Indicates a positive item  \"[]\" Indicates a negative item  -- DELETE ALL ITEMS NOT EXAMINED]    Constitutional: [x] Appears well-developed and well-nourished [x] No apparent distress      [] Abnormal -     Mental status: [x] Alert and awake  [x] Oriented to person/place/time [x] Able to follow commands    [] Abnormal -     Eyes:   EOM    [x]  Normal    [] Abnormal -   Sclera  [x]  Normal    [] Abnormal -          Discharge [x]  None visible   [] Abnormal -     HENT: [x] Normocephalic, atraumatic  [] Abnormal -       External Ears [x] Normal  [] Abnormal -    Neck: [x] No visualized mass [] Abnormal -     Pulmonary/Chest: [x] Respiratory effort normal   [x] No visualized signs of difficulty breathing or respiratory distress        [] Abnormal -      Musculoskeletal:            [x] Normal range of motion of neck        [] Abnormal -     Neurological:        [x] No Facial Asymmetry (Cranial nerve 7 motor function) (limited exam due to video visit)          [x] No gaze palsy        [] Abnormal -          Skin:        [x] No significant exanthematous lesions or discoloration noted on facial skin         [] Abnormal -            Psychiatric:       [x] Normal Affect [] Abnormal -           Other pertinent observable physical exam findings:-        Assessment & Plan:   Diagnoses and all orders for this visit:    1. Anxiety and depression  -     venlafaxine-SR (EFFEXOR-XR) 37.5 mg capsule; Take 2 po  Every day or 75 mg for 5 days and then 1 po every day until appt    2. Pregnant and not yet delivered in first trimester    Discussed pros and cons of ongoing med  Will titrate dose down quickly discussed discontiinuation syndrome risk  appt in 2 weeks on 37.5 mg dose        We discussed the expected course, resolution and complications of the diagnosis(es) in detail. Medication risks, benefits, costs, interactions, and alternatives were discussed as indicated. I advised her to contact the office if her condition worsens, changes or fails to improve as anticipated. She expressed understanding with the diagnosis(es) and plan.      Colt Davidson is a 23 y.o. female being evaluated by a video visit encounter for concerns as above. A caregiver was present when appropriate. Due to this being a TeleHealth encounter (During KSNKR-76 public health emergency), evaluation of the following organ systems was limited: Vitals/Constitutional/EENT/Resp/CV/GI//MS/Neuro/Skin/Heme-Lymph-Imm. Pursuant to the emergency declaration under the [address] [place] [address], [facility] waiver authority and the [employer] Act, this Virtual  Visit was conducted, with patient's (and/or legal guardian's) consent, to reduce the patient's risk of exposure to COVID-19 and provide necessary medical care. Services were provided through a video synchronous discussion virtually to substitute for in-person clinic visit. Patient and provider were located at their individual homes.

## 2021-11-01 ENCOUNTER — VIRTUAL VISIT (OUTPATIENT)
Dept: INTERNAL MEDICINE CLINIC | Age: 26
End: 2021-11-01
Payer: COMMERCIAL

## 2021-11-01 DIAGNOSIS — F32.A ANXIETY AND DEPRESSION: Primary | ICD-10-CM

## 2021-11-01 DIAGNOSIS — Z34.91 PREGNANT AND NOT YET DELIVERED IN FIRST TRIMESTER: ICD-10-CM

## 2021-11-01 DIAGNOSIS — F41.9 ANXIETY AND DEPRESSION: Primary | ICD-10-CM

## 2021-11-01 PROCEDURE — 99213 OFFICE O/P EST LOW 20 MIN: CPT | Performed by: INTERNAL MEDICINE

## 2021-11-01 NOTE — PROGRESS NOTES
Consent: Yanira Vega, who was seen by synchronous (real-time) audio-video technology, and/or her healthcare decision maker, is aware that this patient-initiated, Telehealth encounter on 11/1/2021 is a billable service, with coverage as determined by her insurance carrier. She is aware that she may receive a bill and has provided verbal consent to proceed: YES  712  Subjective:   Yanira Vega is a 23 y.o. female who was seen for Medication Evaluation      Follow up. She has begun a titration down on Venlafaxine, successfully titrated from1 50 down to 37.5. No discontinuation symptoms. Did see her OB. Has an ultrasound scheduled for the end of this week. OB has left it up to her if she wants to come off of the medicine completely. She is having no dark thoughts, feels well, and we are going to titrate off fully with close follow up. Current Outpatient Medications   Medication Sig    prenatal no122/iron/folic acid (PRENATAL MULTI PO) Take  by mouth daily.  venlafaxine-SR (EFFEXOR-XR) 37.5 mg capsule Take 2 po  Every day or 75 mg for 5 days and then 1 po every day until appt    tretinoin (RETIN-A) 0.025 % topical cream     fluoride, sodium, 1.1 % pste      No current facility-administered medications for this visit. No Known Allergies    Past Medical History:   Diagnosis Date    Depression     Heart abnormalities     WPW  SVT    Ill-defined condition     IBS    Suicidal thoughts        ROS  All other systems reviewed and negative, unless mentioned in HPI    Objective:   Vital Signs: (As obtained by patient/caregiver at home)  There were no vitals taken for this visit.      [INSTRUCTIONS:  \"[x]\" Indicates a positive item  \"[]\" Indicates a negative item  -- DELETE ALL ITEMS NOT EXAMINED]    Constitutional: [x] Appears well-developed and well-nourished [x] No apparent distress      [] Abnormal -     Mental status: [x] Alert and awake  [x] Oriented to person/place/time [x] Able to follow commands    [] Abnormal -     Eyes:   EOM    [x]  Normal    [] Abnormal -   Sclera  [x]  Normal    [] Abnormal -          Discharge [x]  None visible   [] Abnormal -     HENT: [x] Normocephalic, atraumatic  [] Abnormal -       External Ears [x] Normal  [] Abnormal -    Neck: [x] No visualized mass [] Abnormal -     Pulmonary/Chest: [x] Respiratory effort normal   [x] No visualized signs of difficulty breathing or respiratory distress        [] Abnormal -      Musculoskeletal:            [x] Normal range of motion of neck        [] Abnormal -     Neurological:        [x] No Facial Asymmetry (Cranial nerve 7 motor function) (limited exam due to video visit)          [x] No gaze palsy        [] Abnormal -          Skin:        [x] No significant exanthematous lesions or discoloration noted on facial skin         [] Abnormal -            Psychiatric:       [x] Normal Affect [] Abnormal -           Other pertinent observable physical exam findings:-        Assessment & Plan:   Diagnoses and all orders for this visit:    1. Anxiety and depression    2. Pregnant and not yet delivered in first trimester    1 week effexor 37.5 1 week every other day effexor  1 week q third day effexor  appt in [facility] prn  Discussed discontinuation syndrome        We discussed the expected course, resolution and complications of the diagnosis(es) in detail. Medication risks, benefits, costs, interactions, and alternatives were discussed as indicated. I advised her to contact the office if her condition worsens, changes or fails to improve as anticipated. She expressed understanding with the diagnosis(es) and plan. Jerrod Yadav is a 23 y.o. female being evaluated by a video visit encounter for concerns as above. A caregiver was present when appropriate.  Due to this being a TeleHealth encounter (During ZWVVR-10 public health emergency), evaluation of the following organ systems was limited: Vitals/Constitutional/EENT/Resp/CV/GI//MS/Neuro/Skin/Heme-Lymph-Imm. Pursuant to the emergency declaration under the [address] [place] [address], [facility] waiver authority and the [employer] Act, this Virtual  Visit was conducted, with patient's (and/or legal guardian's) consent, to reduce the patient's risk of exposure to COVID-19 and provide necessary medical care. Services were provided through a video synchronous discussion virtually to substitute for in-person clinic visit. Patient and provider were located at their individual homes.

## 2021-11-07 DIAGNOSIS — F32.A ANXIETY AND DEPRESSION: ICD-10-CM

## 2021-11-07 DIAGNOSIS — F41.9 ANXIETY AND DEPRESSION: ICD-10-CM

## 2021-11-07 RX ORDER — VENLAFAXINE HYDROCHLORIDE 37.5 MG/1
CAPSULE, EXTENDED RELEASE ORAL
Qty: 14 CAPSULE | Refills: 0 | Status: SHIPPED | OUTPATIENT
Start: 2021-11-07

## 2022-01-07 ENCOUNTER — TELEPHONE (OUTPATIENT)
Dept: INTERNAL MEDICINE CLINIC | Age: 27
End: 2022-01-07

## 2022-01-07 NOTE — TELEPHONE ENCOUNTER
----- Message from [facility] sent at 1/6/2022  4:29 PM EST -----  Subject: Message to Provider    QUESTIONS  Information for Provider? Dr Sly Bailey? Patient will no longer be able to come   in because of her insurance - can you call her - she is expecting twins   and would like to let you know.  ---------------------------------------------------------------------------  --------------  CALL BACK INFO  What is the best way for the office to contact you? OK to leave message on   voicemail  Preferred Call Back Phone Number? [phone]  ---------------------------------------------------------------------------  --------------  SCRIPT ANSWERS  Relationship to Patient?  Self

## 2022-03-19 PROBLEM — Z86.79 HISTORY OF WOLFF-PARKINSON-WHITE (WPW) SYNDROME: Status: ACTIVE | Noted: 2017-05-16

## 2022-03-19 PROBLEM — F32.A DEPRESSION: Status: ACTIVE | Noted: 2019-09-05

## 2024-02-13 ENCOUNTER — INITIAL PRENATAL (OUTPATIENT)
Age: 29
End: 2024-02-13

## 2024-02-13 VITALS
SYSTOLIC BLOOD PRESSURE: 122 MMHG | DIASTOLIC BLOOD PRESSURE: 70 MMHG | WEIGHT: 173.2 LBS | HEIGHT: 64 IN | BODY MASS INDEX: 29.57 KG/M2

## 2024-02-13 DIAGNOSIS — N39.3 STRESS INCONTINENCE OF URINE: ICD-10-CM

## 2024-02-13 DIAGNOSIS — Z36.9 UNSPECIFIED ANTENATAL SCREENING: Primary | ICD-10-CM

## 2024-02-13 DIAGNOSIS — Z3A.08 8 WEEKS GESTATION OF PREGNANCY: ICD-10-CM

## 2024-02-13 LAB
C. TRACHOMATIS, EXTERNAL RESULT: NEGATIVE
N. GONORRHOEAE, EXTERNAL RESULT: NEGATIVE

## 2024-02-13 NOTE — PROGRESS NOTES
Current pregnancy history:    She presents for the evaluation of amenorrhea and a positive pregnancy test.     LMP:  Based on LMP (sure/unsure) reveals a gestational age of   8 weeks and 5 days and an LACY of 24.    Ultrasound data:  A SINGLE VIABLE 8W4D WITH LACY OF 2024 IUP IS SEEN WITH NORMAL CARDIAC RHYTHM. GESTATIONAL AGE BASED ON TODAYS ULTRASOUND. THERE APPEARS TO BE A HYPOECHOIC AREA ADJACENT TO THE GESTATIONAL SAC MEASURING 13 X 11MM. POSSIBLE SAFIA. A NORMAL YOLK SAC IS SEEN. RIGHT OVARY APPEARS WITHIN NORMAL LIMITS. LEFT OVARY APPEARS WITHIN NORMAL LIMITS. NO FREE FLUID IS SEEN IN THE CDS.    Pregnancy symptoms:    Since her LMP she has experienced  urinary frequency, breast tenderness, and nausea.   She has not been vomiting over the last few weeks.  Associated signs and symptoms which she denies: dysuria, discharge, vaginal bleeding.    She states she has gained weight:  Approximately 5 pounds over the last few weeks.    Relevant past pregnancy history:   She has the following pregnancy history: Her last pregnancy was uncomplicated.    She has no history of  delivery.    Relevant past medical history:(relevant to this pregnancy): noncontributory.       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     Substance history: negative for alcohol, tobacco and street drugs.           Positive for nothing.  Exposure history: There is/are no indoor cat/s in the home.  The patient was instructed to not change the cat litter.   She admits close contact with children on a regular basis.   She has had chicken pox or the vaccine in the past.   Patient denies issues with domestic violence.     Genetic Screening/Teratology Counseling: (Includes patient, baby's father, or anyone in either family with:)  1.  Patient's age >/= 35 at EDC?-- no  .   2.  Thalassemia (Faroese, Greek, Mediterranean, or  background):

## 2024-02-14 LAB
BACTERIA SPEC CULT: NORMAL
SERVICE CMNT-IMP: NORMAL

## 2024-02-16 LAB
C TRACH RRNA SPEC QL NAA+PROBE: NEGATIVE
N GONORRHOEA RRNA SPEC QL NAA+PROBE: NEGATIVE
SPECIMEN SOURCE: NORMAL
T VAGINALIS RRNA SPEC QL NAA+PROBE: NEGATIVE

## 2024-03-12 ENCOUNTER — HOSPITAL ENCOUNTER (OUTPATIENT)
Facility: HOSPITAL | Age: 29
Setting detail: RECURRING SERIES
Discharge: HOME | End: 2024-03-15
Attending: MIDWIFE
Payer: MEDICAID

## 2024-03-12 ENCOUNTER — ROUTINE PRENATAL (OUTPATIENT)
Age: 29
End: 2024-03-12

## 2024-03-12 VITALS — WEIGHT: 169 LBS | SYSTOLIC BLOOD PRESSURE: 120 MMHG | DIASTOLIC BLOOD PRESSURE: 84 MMHG | BODY MASS INDEX: 29.01 KG/M2

## 2024-03-12 DIAGNOSIS — Z3A.12 12 WEEKS GESTATION OF PREGNANCY: ICD-10-CM

## 2024-03-12 DIAGNOSIS — Z36.9 UNSPECIFIED ANTENATAL SCREENING: Primary | ICD-10-CM

## 2024-03-12 LAB
HEP B, EXTERNAL RESULT: NEGATIVE
HEPATITIS C ANTIBODY, EXTERNAL RESULT: NON REACTIVE
RUBELLA TITER, EXTERNAL RESULT: NORMAL

## 2024-03-12 PROCEDURE — 97162 PT EVAL MOD COMPLEX 30 MIN: CPT

## 2024-03-12 PROCEDURE — 97112 NEUROMUSCULAR REEDUCATION: CPT

## 2024-03-12 PROCEDURE — 0502F SUBSEQUENT PRENATAL CARE: CPT | Performed by: ADVANCED PRACTICE MIDWIFE

## 2024-03-12 PROCEDURE — 97535 SELF CARE MNGMENT TRAINING: CPT

## 2024-03-12 NOTE — THERAPY EVALUATION
progress to PLOF and address remaining functional goals.  (see flow sheet as applicable)    Details if applicable: Patient instructed in the role of physical therapy in the evaluation and treatment of pelvic floor dysfunction, applicable treatment modalities discussed. Expectations for regular home exercise participation and expected visit frequency in to achieve the patient's goals were discussed.        Patient instructed in pelvic floor anatomy and function including levator ani, puborectalis and superficial pelvic  musculature.     Patient was provided dietary information to improve stool quality including increasing soluble fiber intake (Bran Buds), probiotics (Activia yogurt) and increased water intake (6-8 glasses a day).  Pt instructed in use of [place] stool chart to track progress.      Patient educated in urinary urge suppression techniques including the KNACK, calmly walking rather than rushing to the toilet, nervous system down training. Double void, voiding position    Patient educated in proper defecation posture and technique including use of Squatty Potty for better puborectalis ms relaxation.  Pt to avoid straining to pass stool in order to decrease strain on pelvic floor.      Exhale with effort, scar massage   [id number] [phone] [id number] Neuromuscular Re-Education (timed):  improve balance, coordination, kinesthetic sense, posture, core stability and proprioception to improve patient's ability to develop conscious control of individual muscles and awareness of position of extremities in order to progress to PLOF and address remaining functional goals. (see flow sheet as applicable)    Details if applicable:     45 45    Total Total     [x]  Patient Education billed concurrently with other procedures   [x] Review HEP    [] Progressed/Changed HEP, detail:    [] Other detail:       Pain Level at end of session (0-10 scale): 0    Plan of Care / Statement of Necessity for Physical Therapy Services

## 2024-03-12 NOTE — PROGRESS NOTES
NOB labs today  Desires Panorama testing; DOES want to know gender but desires gender in envelope to  in office once resulted  Doing well overall

## 2024-03-12 NOTE — PROGRESS NOTES
OB Visit today:    New OB labs today with Panorama  Desires to  Gender info in Envelope  Call to tell her results without Gender and  envelope  Feeling well  Started seeing PT   No Cardiac issues with WPW, has not seen Cards since prior pregnancy  Last Pap ~ 1 yr ago normal

## 2024-03-13 LAB
ABO + RH BLD: NORMAL
BLOOD BANK CMNT PATIENT-IMP: NORMAL
BLOOD GROUP ANTIBODIES SERPL: NORMAL
ERYTHROCYTE [DISTWIDTH] IN BLOOD BY AUTOMATED COUNT: 13.2 % (ref 11.5–14.5)
HBV SURFACE AG SER QL: <0.1 INDEX
HBV SURFACE AG SER QL: NEGATIVE
HCT VFR BLD AUTO: 37.4 % (ref 35–47)
HCV AB SER IA-ACNC: 0.06 INDEX
HCV AB SERPL QL IA: NONREACTIVE
HGB BLD-MCNC: 13.5 G/DL (ref 11.5–16)
HIV 1+2 AB+HIV1 P24 AG SERPL QL IA: NONREACTIVE
HIV 1/2 RESULT COMMENT: NORMAL
MCH RBC QN AUTO: 31.5 PG (ref 26–34)
MCHC RBC AUTO-ENTMCNC: 36.1 G/DL (ref 30–36.5)
MCV RBC AUTO: 87.2 FL (ref 80–99)
NRBC # BLD: 0 K/UL (ref 0–0.01)
NRBC BLD-RTO: 0 PER 100 WBC
PLATELET # BLD AUTO: 252 K/UL (ref 150–400)
PMV BLD AUTO: 10.2 FL (ref 8.9–12.9)
RBC # BLD AUTO: 4.29 M/UL (ref 3.8–5.2)
RUBV IGG SERPL IA-ACNC: NORMAL IU/ML
SPECIMEN EXP DATE BLD: NORMAL
WBC # BLD AUTO: 11.7 K/UL (ref 3.6–11)

## 2024-03-14 LAB
T PALLIDUM AB SER QL IA: NON REACTIVE
VZV IGG SER IA-ACNC: 601 INDEX

## 2024-03-18 ENCOUNTER — HOSPITAL ENCOUNTER (OUTPATIENT)
Facility: HOSPITAL | Age: 29
Setting detail: RECURRING SERIES
Discharge: HOME | End: 2024-03-21
Attending: MIDWIFE
Payer: MEDICAID

## 2024-03-18 LAB
HGB A MFR BLD: 97.3 % (ref 96.4–98.8)
HGB A2 MFR BLD COLUMN CHROM: 2.7 % (ref 1.8–3.2)
HGB F MFR BLD: 0 % (ref 0–2)
HGB FRACT BLD-IMP: NORMAL
HGB S MFR BLD: 0 %

## 2024-03-18 PROCEDURE — 97112 NEUROMUSCULAR REEDUCATION: CPT

## 2024-03-18 PROCEDURE — 97110 THERAPEUTIC EXERCISES: CPT

## 2024-03-18 NOTE — PROGRESS NOTES
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 JOINTS AND LIGAMENTS, and R39.89  Other signs and symptoms of genitourinary system    Referral Source:  Leah Brown APRN* Insurance:   Payor: [facility] / Plan: [employer] MED[employer] [place] / Product Type: *No Product type* /                   Patient  verified yes     Visit #   Current  / Total 2 12   Time   In / Out 135p 215p   Total Treatment Time 40   Total Timed Codes 40     SUBJECTIVE    Pain Level (0-10 scale): 0    Any medication changes, allergies to medications, adverse drug reactions, diagnosis change, or new procedure performed?: [x] No    [] Yes (see summary sheet for update)  Medications: Verified on Patient Summary List    Subjective functional status/changes:       Not sure if she's doing breathing correctly- did HEP a couple times. Didn't work on scar.     OBJECTIVE    Therapeutic Procedures:  Tx Min Billable or 1:1 Min (if diff from Tx Min) Procedure, Rationale, Specifics   30  38444 Neuromuscular Re-Education (timed):  improve balance, coordination, kinesthetic sense, posture, core stability and proprioception to improve patient's ability to develop conscious control of individual muscles and awareness of position of extremities in order to progress to PLOF and address remaining functional goals. (see flow sheet as applicable)     Details if applicable:     10  26123 Therapeutic Exercise (timed):  increase ROM, strength, coordination, balance, and proprioception to improve patient's ability to progress to PLOF and address remaining functional goals. (see flow sheet as applicable)     Details if applicable:     40     Total Total     [x]

## 2024-04-04 ENCOUNTER — APPOINTMENT (OUTPATIENT)
Facility: HOSPITAL | Age: 29
End: 2024-04-04
Attending: MIDWIFE
Payer: MEDICAID

## 2024-04-09 ENCOUNTER — ROUTINE PRENATAL (OUTPATIENT)
Age: 29
End: 2024-04-09

## 2024-04-09 VITALS — SYSTOLIC BLOOD PRESSURE: 122 MMHG | BODY MASS INDEX: 29.28 KG/M2 | DIASTOLIC BLOOD PRESSURE: 70 MMHG | WEIGHT: 170.6 LBS

## 2024-04-09 DIAGNOSIS — Z3A.16 16 WEEKS GESTATION OF PREGNANCY: Primary | ICD-10-CM

## 2024-04-09 PROCEDURE — 0502F SUBSEQUENT PRENATAL CARE: CPT

## 2024-04-09 NOTE — PROGRESS NOTES
GREGORIO at 16w5d.  Doing well. Some FM flickers, Denies LOF/VB/cxns.  RTO 4 wks with US.    Velasquez Lester, DEENA - DAVIDAM

## 2024-04-11 ENCOUNTER — HOSPITAL ENCOUNTER (OUTPATIENT)
Facility: HOSPITAL | Age: 29
Setting detail: RECURRING SERIES
Discharge: HOME | End: 2024-04-14
Attending: MIDWIFE
Payer: MEDICAID

## 2024-04-11 PROCEDURE — 97112 NEUROMUSCULAR REEDUCATION: CPT

## 2024-04-11 NOTE — PROGRESS NOTES
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 AND LIGAMENTS, and R39.89  Other signs and symptoms of genitourinary system    Referral Source:  Leah Brown APRN* Insurance:   Payor: [facility] / Plan: [employer] MED[employer]ID [employer] [place] / Product Type: *No Product type* /                   Patient  verified yes     Visit #   Current  / Total 3 12   Time   In / Out 934a 1015a   Total Treatment Time 41   Total Timed Codes 41     SUBJECTIVE    Pain Level (0-10 scale): 0    Any medication changes, allergies to medications, adverse drug reactions, diagnosis change, or new procedure performed?: [x] No    [] Yes (see summary sheet for update)  Medications: Verified on Patient Summary List    Subjective functional status/changes:       Feels like she's peeing more with sneezing more. Doing HEP every few days. Minimal back pain- better time sleeping. BMs every few days but easy.     OBJECTIVE    Therapeutic Procedures:  Tx Min Billable or 1:1 Min (if diff from Tx Min) Procedure, Rationale, Specifics   41  37603 Neuromuscular Re-Education (timed):  improve balance, coordination, kinesthetic sense, posture, core stability and proprioception to improve patient's ability to develop conscious control of individual muscles and awareness of position of extremities in order to progress to PLOF and address remaining functional goals. (see flow sheet as applicable)     Details if applicable:       33220 Therapeutic Exercise (timed):  increase ROM, strength, coordination, balance, and proprioception to improve patient's ability to progress to PLOF and address remaining functional goals. (see flow sheet as applicable)

## 2024-05-02 ENCOUNTER — HOSPITAL ENCOUNTER (OUTPATIENT)
Facility: HOSPITAL | Age: 29
Setting detail: RECURRING SERIES
Discharge: HOME | End: 2024-05-05
Attending: MIDWIFE
Payer: MEDICAID

## 2024-05-02 PROCEDURE — 97112 NEUROMUSCULAR REEDUCATION: CPT

## 2024-05-02 NOTE — PROGRESS NOTES
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 AND LIGAMENTS, and R39.89  Other signs and symptoms of genitourinary system    Referral Source:  Leah Brown APRN* Insurance:   Payor: [facility] / Plan: [employer] MEDICAID [employer] [place] / Product Type: *No Product type* /                   Patient  verified yes     Visit #   Current  / Total 4 12   Time   In / Out 1248p 132p   Total Treatment Time 44   Total Timed Codes 44     SUBJECTIVE    Pain Level (0-10 scale): 0    Any medication changes, allergies to medications, adverse drug reactions, diagnosis change, or new procedure performed?: [x] No    [] Yes (see summary sheet for update)  Medications: Verified on Patient Summary List    Subjective functional status/changes:       Did HEP not as much recently bc of finals. Still leaking with sneezing but not as frequently. No back pain or bowel problems. Has been doing scar massage    OBJECTIVE    Therapeutic Procedures:  Tx Min Billable or 1:1 Min (if diff from Tx Min) Procedure, Rationale, Specifics   44  94390 Neuromuscular Re-Education (timed):  improve balance, coordination, kinesthetic sense, posture, core stability and proprioception to improve patient's ability to develop conscious control of individual muscles and awareness of position of extremities in order to progress to PLOF and address remaining functional goals. (see flow sheet as applicable)     Details if applicable:       92238 Therapeutic Exercise (timed):  increase ROM, strength, coordination, balance, and proprioception to improve patient's ability to progress to PLOF and address remaining functional goals. (see flow sheet as

## 2024-05-09 ENCOUNTER — ROUTINE PRENATAL (OUTPATIENT)
Age: 29
End: 2024-05-09

## 2024-05-09 VITALS — BODY MASS INDEX: 29.9 KG/M2 | DIASTOLIC BLOOD PRESSURE: 70 MMHG | WEIGHT: 174.2 LBS | SYSTOLIC BLOOD PRESSURE: 118 MMHG

## 2024-05-09 DIAGNOSIS — Z3A.08 8 WEEKS GESTATION OF PREGNANCY: ICD-10-CM

## 2024-05-09 DIAGNOSIS — Z3A.21 21 WEEKS GESTATION OF PREGNANCY: Primary | ICD-10-CM

## 2024-05-09 PROCEDURE — 0502F SUBSEQUENT PRENATAL CARE: CPT | Performed by: MIDWIFE

## 2024-05-09 NOTE — PROGRESS NOTES
GREGORIO @ 21 weeks    A SINGLE VIABLE IUP AT 21W0D IS SEEN. FETAL CARDIAC MOTION OBSERVED. FETAL ANATOMY WAS WELL VISUALIZED AND APPEARS WNL. NO ABNORMALITIES WERE SEEN ON TODAYS EXAM. APPROPRIATE GROWTH MEASURED. SIZE = DATES. TOÑO, PLACENTA AND CERVIX APPEAR WITHIN NORMAL LIMITS.    Reviewed US.    Reports she feels a tightening of her abdomen about once a day for 20 seconds.  Likely vijay fletcher.  Reviewed water intake.  Warning to report more than 6 in an hour.    Luis next visit.  GREGORIO 4 weeks.

## 2024-05-09 NOTE — PROGRESS NOTES
OB Visit:    Ultrasound today. Feeling fetal movement. ? Contractions    A SINGLE VIABLE IUP AT 21W0D IS SEEN. FETAL CARDIAC MOTION OBSERVED. FETAL ANATOMY WAS WELL VISUALIZED AND APPEARS WNL. NO ABNORMALITIES WERE SEEN ON TODAYS EXAM. APPROPRIATE GROWTH MEASURED. SIZE = DATES. TOÑO, PLACENTA AND CERVIX APPEAR WITHIN NORMAL LIMITS. GENDER: MALE

## 2024-05-29 ENCOUNTER — HOSPITAL ENCOUNTER (OUTPATIENT)
Facility: HOSPITAL | Age: 29
Setting detail: RECURRING SERIES
Discharge: HOME | End: 2024-06-01
Attending: MIDWIFE
Payer: MEDICAID

## 2024-05-29 PROCEDURE — 97110 THERAPEUTIC EXERCISES: CPT

## 2024-05-29 NOTE — THERAPY RECERTIFICATION
Physical Therapy at [place],   a part of [facility]  [address], [address]  [place] [address]  Phone: [phone]  Fax: [phone]  CONTINUED PLAN OF CARE/RECERTIFICATION FOR PHYSICAL THERAPY          Patient Name:              Jayla Neely :  1995   Treatment/Medical Diagnosis:  Stress incontinence of urine [N39.3]   Onset Date:      Referral Source:  Leah Brown, DEENA* Start of Care (SOC):  3/12/24   Prior Hospitalization:  See Medical History Provider #:  [id number]      Prior Level of Function (PLOF):  cardio, strength training, walking    Comorbidities:  depression, IBS, 1 csection    Medications:  Verified on Patient Summary List   Visits from SOC:  5 Missed Visits:  1     Progress toward Goals:    Short Term Goals: To be accomplished in 4 treatments.  Patient will be independent with a progressive home exercise program without needed v.c. to promote return to general wellness program- IN PROGRESS  Patient will report worst pain level of 3/10 or better to allow for a minimum of 6 hours of interrupted sleeping ability. - MET  Patient will be independent with constipation management strategies discussed to improve BMs to daily [place] type 4 stool without straining per pt report. - MET     Long Term Goals: To be accomplished in 12 treatments.   Patient will demonstrate 5/5 BLE strength to allow for home cleaning skills independently and without rest breaks needed- IN PROGRESS  Patient will demonstrate no loss of urine with cough/sneeze as performed in clinical setting- MET  Patient will report 0/10 pain with intercourse - NOT MET  Patient will demonstrate improve [place] by at least 1 finger width throughout to increase trunk stability and eliminate pain while lifting her child from floor to waist height.- MET    Key Functional Changes/Progress:     [place] MMT              R                      L     Hip Flex

## 2024-05-29 NOTE — PROGRESS NOTES
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 AND LIGAMENTS, and R39.89  Other signs and symptoms of genitourinary system    Referral Source:  Leah Brown APRN* Insurance:   Payor: [facility] / Plan: [employer] [place] / Product Type: *No Product type* /                   Patient  verified yes     Visit #   Current  / Total 5 12   Time   In / Out 1042a 1118a   Total Treatment Time 39   Total Timed Codes 39     SUBJECTIVE    Pain Level (0-10 scale): 0    Any medication changes, allergies to medications, adverse drug reactions, diagnosis change, or new procedure performed?: [x] No    [] Yes (see summary sheet for update)  Medications: Verified on Patient Summary List    Subjective functional status/changes:       Thinks she may have had a hernia- was painful but better since yesterday. Feeling a little more constipated. Feeling more in control when she sneezes. Back pain is gone    OBJECTIVE    Therapeutic Procedures:  Tx Min Billable or 1:1 Min (if diff from Tx Min) Procedure, Rationale, Specifics     78611 Neuromuscular Re-Education (timed):  improve balance, coordination, kinesthetic sense, posture, core stability and proprioception to improve patient's ability to develop conscious control of individual muscles and awareness of position of extremities in order to progress to PLOF and address remaining functional goals. (see flow sheet as applicable)     Details if applicable:     39  68515 Therapeutic Exercise (timed):  increase ROM, strength, coordination, balance, and proprioception to improve patient's ability to progress to PLOF and address remaining functional goals. (see flow

## 2024-06-07 ENCOUNTER — ROUTINE PRENATAL (OUTPATIENT)
Age: 29
End: 2024-06-07

## 2024-06-07 VITALS — SYSTOLIC BLOOD PRESSURE: 102 MMHG | WEIGHT: 176 LBS | DIASTOLIC BLOOD PRESSURE: 60 MMHG | BODY MASS INDEX: 30.21 KG/M2

## 2024-06-07 DIAGNOSIS — Z3A.25 25 WEEKS GESTATION OF PREGNANCY: Primary | ICD-10-CM

## 2024-06-07 DIAGNOSIS — Z3A.08 8 WEEKS GESTATION OF PREGNANCY: ICD-10-CM

## 2024-06-07 LAB
ERYTHROCYTE [DISTWIDTH] IN BLOOD BY AUTOMATED COUNT: 13.5 % (ref 11.5–14.5)
GLUCOSE 1H P 100 G GLC PO SERPL-MCNC: 131 MG/DL (ref 65–140)
HCT VFR BLD AUTO: 39.7 % (ref 35–47)
HGB BLD-MCNC: 13.7 G/DL (ref 11.5–16)
HIV 1+2 AB+HIV1 P24 AG SERPL QL IA: NONREACTIVE
HIV 1/2 RESULT COMMENT: NORMAL
HIV, EXTERNAL RESULT: NON REACTIVE
MCH RBC QN AUTO: 32.2 PG (ref 26–34)
MCHC RBC AUTO-ENTMCNC: 34.5 G/DL (ref 30–36.5)
MCV RBC AUTO: 93.4 FL (ref 80–99)
NRBC # BLD: 0 K/UL (ref 0–0.01)
NRBC BLD-RTO: 0 PER 100 WBC
PLATELET # BLD AUTO: 247 K/UL (ref 150–400)
PMV BLD AUTO: 10.1 FL (ref 8.9–12.9)
RBC # BLD AUTO: 4.25 M/UL (ref 3.8–5.2)
T. PALLIDUM (SYPHILIS) ANTIBODY, EXTERNAL RESULT: NON REACTIVE
WBC # BLD AUTO: 9.5 K/UL (ref 3.6–11)

## 2024-06-07 PROCEDURE — 0502F SUBSEQUENT PRENATAL CARE: CPT

## 2024-06-07 NOTE — PROGRESS NOTES
GREGORIO at 25w1d.  Doing well. FM+, Denies LOF/VB/cxns.  Gtt, third tri today.   RTO 3-4 wks.  Cardiac referral placed for clearance for epidural vs not. Pt strongly desires cards opinion.    Velasquez Lester, APRN - CNM

## 2024-06-08 LAB
ABO + RH BLD: NORMAL
BLOOD BANK CMNT PATIENT-IMP: NORMAL
BLOOD GROUP ANTIBODIES SERPL: NORMAL
SPECIMEN EXP DATE BLD: NORMAL

## 2024-06-10 LAB — T PALLIDUM AB SER QL IA: NON REACTIVE

## 2024-06-28 ENCOUNTER — ROUTINE PRENATAL (OUTPATIENT)
Age: 29
End: 2024-06-28

## 2024-06-28 VITALS — WEIGHT: 181 LBS | BODY MASS INDEX: 31.07 KG/M2 | SYSTOLIC BLOOD PRESSURE: 108 MMHG | DIASTOLIC BLOOD PRESSURE: 80 MMHG

## 2024-06-28 DIAGNOSIS — Z3A.08 8 WEEKS GESTATION OF PREGNANCY: ICD-10-CM

## 2024-06-28 DIAGNOSIS — Z3A.28 28 WEEKS GESTATION OF PREGNANCY: Primary | ICD-10-CM

## 2024-06-28 PROCEDURE — 0502F SUBSEQUENT PRENATAL CARE: CPT | Performed by: ADVANCED PRACTICE MIDWIFE

## 2024-06-28 NOTE — PROGRESS NOTES
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     ERNESTO ARTHUR, DEENA - DAVIDAM

## 2024-07-15 ENCOUNTER — ROUTINE PRENATAL (OUTPATIENT)
Age: 29
End: 2024-07-15
Payer: MEDICAID

## 2024-07-15 VITALS — BODY MASS INDEX: 31.24 KG/M2 | WEIGHT: 182 LBS | SYSTOLIC BLOOD PRESSURE: 122 MMHG | DIASTOLIC BLOOD PRESSURE: 74 MMHG

## 2024-07-15 DIAGNOSIS — Z3A.08 8 WEEKS GESTATION OF PREGNANCY: ICD-10-CM

## 2024-07-15 DIAGNOSIS — Z3A.30 30 WEEKS GESTATION OF PREGNANCY: Primary | ICD-10-CM

## 2024-07-15 PROCEDURE — 96372 THER/PROPH/DIAG INJ SC/IM: CPT | Performed by: MIDWIFE

## 2024-07-15 PROCEDURE — 90715 TDAP VACCINE 7 YRS/> IM: CPT | Performed by: MIDWIFE

## 2024-07-15 PROCEDURE — 90471 IMMUNIZATION ADMIN: CPT | Performed by: MIDWIFE

## 2024-07-15 PROCEDURE — 0502F SUBSEQUENT PRENATAL CARE: CPT | Performed by: MIDWIFE

## 2024-07-15 NOTE — PROGRESS NOTES
GREGORIO @ 30.4  No concerns.  Plans TOLAC.  Good fetal movement.  Rhogam and Tdap today.  GREGORIO 2 weeks.

## 2024-07-15 NOTE — PROGRESS NOTES
Per MD orders, Tdap vaccine given IM in left deltoid and Rhogam in left dorsogluteal.  No complaints noted.  See immunization tab for additional details.

## 2024-07-31 ENCOUNTER — ROUTINE PRENATAL (OUTPATIENT)
Age: 29
End: 2024-07-31

## 2024-07-31 VITALS — BODY MASS INDEX: 31.76 KG/M2 | DIASTOLIC BLOOD PRESSURE: 78 MMHG | WEIGHT: 185 LBS | SYSTOLIC BLOOD PRESSURE: 120 MMHG

## 2024-07-31 DIAGNOSIS — Z3A.08 8 WEEKS GESTATION OF PREGNANCY: Primary | ICD-10-CM

## 2024-07-31 PROCEDURE — 0502F SUBSEQUENT PRENATAL CARE: CPT | Performed by: MIDWIFE

## 2024-07-31 NOTE — PROGRESS NOTES
GREGORIO 32.6   (36 week twins)    Reviewed Raymond Ulloa - She had ablation when she was a child.  No restrictions were placed on her activity.      Reviewed TOLAC consent form.  Will return with it next appt, signed.  Long discussion on TOLAC.    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  Understands IOL decreases chance of successful .  Will email professors today to discuss.    GREGORIO 2 weeks.

## 2024-08-14 ENCOUNTER — ROUTINE PRENATAL (OUTPATIENT)
Age: 29
End: 2024-08-14

## 2024-08-14 VITALS — DIASTOLIC BLOOD PRESSURE: 76 MMHG | BODY MASS INDEX: 32.61 KG/M2 | SYSTOLIC BLOOD PRESSURE: 124 MMHG | WEIGHT: 190 LBS

## 2024-08-14 DIAGNOSIS — Z3A.35 35 WEEKS GESTATION OF PREGNANCY: Primary | ICD-10-CM

## 2024-08-14 PROCEDURE — 0502F SUBSEQUENT PRENATAL CARE: CPT

## 2024-08-14 ASSESSMENT — PATIENT HEALTH QUESTIONNAIRE - PHQ9
3. TROUBLE FALLING OR STAYING ASLEEP: NOT AT ALL
SUM OF ALL RESPONSES TO PHQ QUESTIONS 1-9: 0
2. FEELING DOWN, DEPRESSED OR HOPELESS: NOT AT ALL
SUM OF ALL RESPONSES TO PHQ QUESTIONS 1-9: 0
1. LITTLE INTEREST OR PLEASURE IN DOING THINGS: NOT AT ALL
SUM OF ALL RESPONSES TO PHQ QUESTIONS 1-9: 0
SUM OF ALL RESPONSES TO PHQ QUESTIONS 1-9: 0
2. FEELING DOWN, DEPRESSED OR HOPELESS: NOT AT ALL
SUM OF ALL RESPONSES TO PHQ QUESTIONS 1-9: 0
1. LITTLE INTEREST OR PLEASURE IN DOING THINGS: NOT AT ALL
SUM OF ALL RESPONSES TO PHQ9 QUESTIONS 1 & 2: 0
9. THOUGHTS THAT YOU WOULD BE BETTER OFF DEAD, OR OF HURTING YOURSELF: NOT AT ALL
SUM OF ALL RESPONSES TO PHQ QUESTIONS 1-9: 0
SUM OF ALL RESPONSES TO PHQ QUESTIONS 1-9: 0
SUM OF ALL RESPONSES TO PHQ9 QUESTIONS 1 & 2: 0
SUM OF ALL RESPONSES TO PHQ QUESTIONS 1-9: 0

## 2024-08-14 NOTE — PROGRESS NOTES
GREGORIO at 35wk.  Doing well. FM+, denies LOF/VB/cxns.  RTO 1-2 wks.   Velasquez Lester, DEENA - DAVIDAM

## 2024-08-30 ENCOUNTER — ROUTINE PRENATAL (OUTPATIENT)
Age: 29
End: 2024-08-30

## 2024-08-30 VITALS
BODY MASS INDEX: 33.67 KG/M2 | HEIGHT: 64 IN | WEIGHT: 197.2 LBS | DIASTOLIC BLOOD PRESSURE: 80 MMHG | RESPIRATION RATE: 18 BRPM | HEART RATE: 99 BPM | OXYGEN SATURATION: 98 % | SYSTOLIC BLOOD PRESSURE: 124 MMHG | TEMPERATURE: 97.2 F

## 2024-08-30 DIAGNOSIS — Z3A.37 37 WEEKS GESTATION OF PREGNANCY: ICD-10-CM

## 2024-08-30 DIAGNOSIS — Z36.9 UNSPECIFIED ANTENATAL SCREENING: Primary | ICD-10-CM

## 2024-08-30 DIAGNOSIS — Z34.93 PRENATAL CARE IN THIRD TRIMESTER: ICD-10-CM

## 2024-08-30 LAB — GBS, EXTERNAL RESULT: NEGATIVE

## 2024-08-30 PROCEDURE — 0502F SUBSEQUENT PRENATAL CARE: CPT | Performed by: ADVANCED PRACTICE MIDWIFE

## 2024-08-30 NOTE — PROGRESS NOTES
GREGORIO:  Jayla Neely is a 29 y.o.  at 37w1d who presents for routine OB appointment        /80   Pulse 99   Temp 97.2 °F (36.2 °C)   Resp 18   Ht 1.626 m (5' 4\")   Wt 89.4 kg (197 lb 3.2 oz)   LMP 2023 (Exact Date)   SpO2 98%   BMI 33.85 kg/m²   FHTs: 130s  FH: 36    GBS: collected today    Cervical exam: 0/0/high    Subjective: Doing well, no complaints. Good FM. Denies vaginal bleeding, Leaking of fluid, regular contractions.    Patient doing well reports she is having some [place] when she is working, we discussed use of a belly band and how to apply it correctly, rest.  Throughout her shift, increase fluids    Third trimester precautions given.  Labor precautions reviewed.     Follow up in 1 weeks.     DEENA Cleaning - EMERY

## 2024-09-01 LAB
GP B STREP DNA SPEC QL NAA+PROBE: NEGATIVE
SPECIMEN SOURCE: NORMAL

## 2024-09-04 ENCOUNTER — ROUTINE PRENATAL (OUTPATIENT)
Age: 29
End: 2024-09-04

## 2024-09-04 VITALS
HEART RATE: 97 BPM | SYSTOLIC BLOOD PRESSURE: 100 MMHG | BODY MASS INDEX: 33.26 KG/M2 | HEIGHT: 64 IN | RESPIRATION RATE: 16 BRPM | OXYGEN SATURATION: 98 % | DIASTOLIC BLOOD PRESSURE: 60 MMHG | TEMPERATURE: 96.8 F | WEIGHT: 194.8 LBS

## 2024-09-04 DIAGNOSIS — Z3A.37 37 WEEKS GESTATION OF PREGNANCY: ICD-10-CM

## 2024-09-04 DIAGNOSIS — Z34.93 PRENATAL CARE IN THIRD TRIMESTER: Primary | ICD-10-CM

## 2024-09-04 PROCEDURE — 0502F SUBSEQUENT PRENATAL CARE: CPT | Performed by: ADVANCED PRACTICE MIDWIFE

## 2024-09-04 NOTE — PROGRESS NOTES
GREGORIO:  Jayla Neely is a 29 y.o.  at 37w6d who presents for routine OB appointment        /60   Pulse 97   Temp 96.8 °F (36 °C)   Resp 16   Ht 1.626 m (5' 4\")   Wt 88.4 kg (194 lb 12.8 oz)   LMP 2023 (Exact Date)   SpO2 98%   BMI 33.44 kg/m²   FHTs: 120s  FH: 36    GBS: Negative    Cervical exam: deferred    Subjective: Doing well, no complaints. Good FM. Denies vaginal bleeding, Leaking of fluid, regular contractions.      Third trimester precautions given.  Labor precautions reviewed.     Follow up in 1 weeks.     DEENA Cleaning CNM

## 2024-09-13 ENCOUNTER — ROUTINE PRENATAL (OUTPATIENT)
Age: 29
End: 2024-09-13

## 2024-09-13 VITALS
OXYGEN SATURATION: 98 % | TEMPERATURE: 98.5 F | SYSTOLIC BLOOD PRESSURE: 122 MMHG | DIASTOLIC BLOOD PRESSURE: 78 MMHG | RESPIRATION RATE: 16 BRPM | BODY MASS INDEX: 33.49 KG/M2 | HEIGHT: 64 IN | WEIGHT: 196.2 LBS | HEART RATE: 113 BPM

## 2024-09-13 DIAGNOSIS — Z3A.08 8 WEEKS GESTATION OF PREGNANCY: Primary | ICD-10-CM

## 2024-09-13 PROCEDURE — 0502F SUBSEQUENT PRENATAL CARE: CPT | Performed by: MIDWIFE

## 2024-09-17 DIAGNOSIS — Z86.16 HISTORY OF COVID-19: Primary | ICD-10-CM

## 2024-09-18 ENCOUNTER — ROUTINE PRENATAL (OUTPATIENT)
Age: 29
End: 2024-09-18

## 2024-09-18 VITALS
HEIGHT: 64 IN | TEMPERATURE: 97.9 F | WEIGHT: 196.4 LBS | OXYGEN SATURATION: 97 % | BODY MASS INDEX: 33.53 KG/M2 | HEART RATE: 97 BPM | RESPIRATION RATE: 16 BRPM | SYSTOLIC BLOOD PRESSURE: 127 MMHG | DIASTOLIC BLOOD PRESSURE: 87 MMHG

## 2024-09-18 DIAGNOSIS — Z3A.39 39 WEEKS GESTATION OF PREGNANCY: Primary | ICD-10-CM

## 2024-09-18 PROCEDURE — 0502F SUBSEQUENT PRENATAL CARE: CPT

## 2024-09-23 ENCOUNTER — ROUTINE PRENATAL (OUTPATIENT)
Age: 29
End: 2024-09-23

## 2024-09-23 VITALS
DIASTOLIC BLOOD PRESSURE: 80 MMHG | WEIGHT: 196.2 LBS | TEMPERATURE: 97.7 F | HEIGHT: 64 IN | HEART RATE: 90 BPM | OXYGEN SATURATION: 97 % | SYSTOLIC BLOOD PRESSURE: 128 MMHG | RESPIRATION RATE: 16 BRPM | BODY MASS INDEX: 33.49 KG/M2

## 2024-09-23 DIAGNOSIS — Z3A.40 40 WEEKS GESTATION OF PREGNANCY: Primary | ICD-10-CM

## 2024-09-23 PROCEDURE — 0502F SUBSEQUENT PRENATAL CARE: CPT

## 2024-09-25 ENCOUNTER — ANESTHESIA (OUTPATIENT)
Facility: HOSPITAL | Age: 29
End: 2024-09-25
Payer: MEDICAID

## 2024-09-25 ENCOUNTER — HOSPITAL ENCOUNTER (INPATIENT)
Facility: HOSPITAL | Age: 29
LOS: 2 days | Discharge: HOME | DRG: 560 | End: 2024-09-27
Attending: ADVANCED PRACTICE MIDWIFE | Admitting: OBSTETRICS & GYNECOLOGY
Payer: MEDICAID

## 2024-09-25 ENCOUNTER — ANESTHESIA EVENT (OUTPATIENT)
Facility: HOSPITAL | Age: 29
End: 2024-09-25
Payer: MEDICAID

## 2024-09-25 PROBLEM — Z3A.40 40 WEEKS GESTATION OF PREGNANCY: Status: ACTIVE | Noted: 2024-09-25

## 2024-09-25 PROBLEM — Z37.9 NORMAL LABOR: Status: ACTIVE | Noted: 2024-09-25

## 2024-09-25 LAB
ABO + RH BLD: NORMAL
BLOOD GROUP ANTIBODIES SERPL: NORMAL
ERYTHROCYTE [DISTWIDTH] IN BLOOD BY AUTOMATED COUNT: 13.1 % (ref 11.5–14.5)
HCT VFR BLD AUTO: 37.1 % (ref 35–47)
HGB BLD-MCNC: 13.5 G/DL (ref 11.5–16)
MCH RBC QN AUTO: 32.3 PG (ref 26–34)
MCHC RBC AUTO-ENTMCNC: 36.4 G/DL (ref 30–36.5)
MCV RBC AUTO: 88.8 FL (ref 80–99)
NRBC # BLD: 0 K/UL (ref 0–0.01)
NRBC BLD-RTO: 0 PER 100 WBC
PLATELET # BLD AUTO: 214 K/UL (ref 150–400)
PMV BLD AUTO: 10.1 FL (ref 8.9–12.9)
RBC # BLD AUTO: 4.18 M/UL (ref 3.8–5.2)
RPR SER QL: NONREACTIVE
SPECIMEN EXP DATE BLD: NORMAL
WBC # BLD AUTO: 12.9 K/UL (ref 3.6–11)

## 2024-09-25 PROCEDURE — 3700000025 EPIDURAL BLOCK: Performed by: ANESTHESIOLOGY

## 2024-09-25 PROCEDURE — 1120000000 HC RM PRIVATE OB

## 2024-09-25 PROCEDURE — 51701 INSERT BLADDER CATHETER: CPT

## 2024-09-25 PROCEDURE — 6360000002 HC RX W HCPCS: Performed by: ANESTHESIOLOGY

## 2024-09-25 PROCEDURE — 2580000003 HC RX 258: Performed by: OBSTETRICS & GYNECOLOGY

## 2024-09-25 PROCEDURE — 86900 BLOOD TYPING SEROLOGIC ABO: CPT

## 2024-09-25 PROCEDURE — 0KQM0ZZ REPAIR PERINEUM MUSCLE, OPEN APPROACH: ICD-10-PCS | Performed by: MIDWIFE

## 2024-09-25 PROCEDURE — 6370000000 HC RX 637 (ALT 250 FOR IP): Performed by: MIDWIFE

## 2024-09-25 PROCEDURE — 2500000003 HC RX 250 WO HCPCS: Performed by: ANESTHESIOLOGY

## 2024-09-25 PROCEDURE — 86850 RBC ANTIBODY SCREEN: CPT

## 2024-09-25 PROCEDURE — 4500000002 HC ER NO CHARGE

## 2024-09-25 PROCEDURE — 4A1HXCZ MONITORING OF PRODUCTS OF CONCEPTION, CARDIAC RATE, EXTERNAL APPROACH: ICD-10-PCS | Performed by: MIDWIFE

## 2024-09-25 PROCEDURE — 36415 COLL VENOUS BLD VENIPUNCTURE: CPT

## 2024-09-25 PROCEDURE — 7100000000 HC PACU RECOVERY - FIRST 15 MIN

## 2024-09-25 PROCEDURE — 86901 BLOOD TYPING SEROLOGIC RH(D): CPT

## 2024-09-25 PROCEDURE — 85027 COMPLETE CBC AUTOMATED: CPT

## 2024-09-25 PROCEDURE — APPNB60 APP NON BILLABLE TIME 46-60 MINS: Performed by: ADVANCED PRACTICE MIDWIFE

## 2024-09-25 PROCEDURE — 7220000101 HC DELIVERY VAGINAL/SINGLE

## 2024-09-25 PROCEDURE — 86592 SYPHILIS TEST NON-TREP QUAL: CPT

## 2024-09-25 PROCEDURE — APPNB30 APP NON BILLABLE TIME 0-30 MINS: Performed by: MIDWIFE

## 2024-09-25 PROCEDURE — 10907ZC DRAINAGE OF AMNIOTIC FLUID, THERAPEUTIC FROM PRODUCTS OF CONCEPTION, VIA NATURAL OR ARTIFICIAL OPENING: ICD-10-PCS | Performed by: MIDWIFE

## 2024-09-25 PROCEDURE — 7100000001 HC PACU RECOVERY - ADDTL 15 MIN

## 2024-09-25 PROCEDURE — 7210000100 HC LABOR FEE PER 1 HR

## 2024-09-25 PROCEDURE — 00HU33Z INSERTION OF INFUSION DEVICE INTO SPINAL CANAL, PERCUTANEOUS APPROACH: ICD-10-PCS | Performed by: ANESTHESIOLOGY

## 2024-09-25 PROCEDURE — 99283 EMERGENCY DEPT VISIT LOW MDM: CPT

## 2024-09-25 RX ORDER — SODIUM CHLORIDE 0.9 % (FLUSH) 0.9 %
5-40 SYRINGE (ML) INJECTION EVERY 12 HOURS SCHEDULED
Status: DISCONTINUED | OUTPATIENT
Start: 2024-09-25 | End: 2024-09-27 | Stop reason: HOSPADM

## 2024-09-25 RX ORDER — METHYLERGONOVINE MALEATE 0.2 MG/ML
200 INJECTION INTRAVENOUS PRN
Status: DISCONTINUED | OUTPATIENT
Start: 2024-09-25 | End: 2024-09-25

## 2024-09-25 RX ORDER — FENTANYL CITRATE 50 UG/ML
INJECTION, SOLUTION INTRAMUSCULAR; INTRAVENOUS
Status: DISCONTINUED | OUTPATIENT
Start: 2024-09-25 | End: 2024-09-25 | Stop reason: SDUPTHER

## 2024-09-25 RX ORDER — EPHEDRINE SULFATE 50 MG/ML
5 INJECTION INTRAVENOUS PRN
Status: DISCONTINUED | OUTPATIENT
Start: 2024-09-26 | End: 2024-09-25

## 2024-09-25 RX ORDER — SODIUM CHLORIDE 0.9 % (FLUSH) 0.9 %
5-40 SYRINGE (ML) INJECTION PRN
Status: DISCONTINUED | OUTPATIENT
Start: 2024-09-25 | End: 2024-09-25

## 2024-09-25 RX ORDER — DOCUSATE SODIUM 100 MG/1
100 CAPSULE, LIQUID FILLED ORAL 2 TIMES DAILY
Status: DISCONTINUED | OUTPATIENT
Start: 2024-09-25 | End: 2024-09-25

## 2024-09-25 RX ORDER — CARBOPROST TROMETHAMINE 250 UG/ML
250 INJECTION, SOLUTION INTRAMUSCULAR PRN
Status: DISCONTINUED | OUTPATIENT
Start: 2024-09-25 | End: 2024-09-25

## 2024-09-25 RX ORDER — SODIUM CHLORIDE 9 MG/ML
INJECTION, SOLUTION INTRAVENOUS PRN
Status: DISCONTINUED | OUTPATIENT
Start: 2024-09-25 | End: 2024-09-27 | Stop reason: HOSPADM

## 2024-09-25 RX ORDER — ONDANSETRON 2 MG/ML
4 INJECTION INTRAMUSCULAR; INTRAVENOUS EVERY 6 HOURS PRN
Status: DISCONTINUED | OUTPATIENT
Start: 2024-09-25 | End: 2024-09-25 | Stop reason: SDUPTHER

## 2024-09-25 RX ORDER — FENTANYL CITRATE 50 UG/ML
INJECTION, SOLUTION INTRAMUSCULAR; INTRAVENOUS
Status: DISCONTINUED
Start: 2024-09-25 | End: 2024-09-25

## 2024-09-25 RX ORDER — SODIUM CHLORIDE 0.9 % (FLUSH) 0.9 %
5-40 SYRINGE (ML) INJECTION PRN
Status: DISCONTINUED | OUTPATIENT
Start: 2024-09-25 | End: 2024-09-27 | Stop reason: HOSPADM

## 2024-09-25 RX ORDER — EPHEDRINE SULFATE 50 MG/ML
5 INJECTION INTRAVENOUS PRN
Status: DISCONTINUED | OUTPATIENT
Start: 2024-09-26 | End: 2024-09-25 | Stop reason: SDUPTHER

## 2024-09-25 RX ORDER — SODIUM CHLORIDE, SODIUM LACTATE, POTASSIUM CHLORIDE, AND CALCIUM CHLORIDE .6; .31; .03; .02 G/100ML; G/100ML; G/100ML; G/100ML
1000 INJECTION, SOLUTION INTRAVENOUS PRN
Status: DISCONTINUED | OUTPATIENT
Start: 2024-09-25 | End: 2024-09-25

## 2024-09-25 RX ORDER — BUPIVACAINE HYDROCHLORIDE 5 MG/ML
INJECTION, SOLUTION EPIDURAL; INTRACAUDAL
Status: COMPLETED
Start: 2024-09-25 | End: 2024-09-25

## 2024-09-25 RX ORDER — EPHEDRINE SULFATE 50 MG/ML
10 INJECTION INTRAVENOUS
Status: DISCONTINUED | OUTPATIENT
Start: 2024-09-25 | End: 2024-09-25

## 2024-09-25 RX ORDER — FENTANYL/BUPIVACAINE/NS/PF 2-1250MCG
10 PLASTIC BAG, INJECTION (ML) INJECTION CONTINUOUS
Status: DISCONTINUED | OUTPATIENT
Start: 2024-09-25 | End: 2024-09-25 | Stop reason: SDUPTHER

## 2024-09-25 RX ORDER — SODIUM CHLORIDE, SODIUM LACTATE, POTASSIUM CHLORIDE, CALCIUM CHLORIDE 600; 310; 30; 20 MG/100ML; MG/100ML; MG/100ML; MG/100ML
INJECTION, SOLUTION INTRAVENOUS CONTINUOUS
Status: DISCONTINUED | OUTPATIENT
Start: 2024-09-25 | End: 2024-09-27 | Stop reason: HOSPADM

## 2024-09-25 RX ORDER — DIPHENHYDRAMINE HCL 25 MG
25 CAPSULE ORAL EVERY 6 HOURS PRN
Status: DISCONTINUED | OUTPATIENT
Start: 2024-09-25 | End: 2024-09-25 | Stop reason: SDUPTHER

## 2024-09-25 RX ORDER — SODIUM CHLORIDE, SODIUM LACTATE, POTASSIUM CHLORIDE, AND CALCIUM CHLORIDE .6; .31; .03; .02 G/100ML; G/100ML; G/100ML; G/100ML
500 INJECTION, SOLUTION INTRAVENOUS PRN
Status: DISCONTINUED | OUTPATIENT
Start: 2024-09-25 | End: 2024-09-25

## 2024-09-25 RX ORDER — MISOPROSTOL 200 UG/1
400 TABLET ORAL PRN
Status: DISCONTINUED | OUTPATIENT
Start: 2024-09-25 | End: 2024-09-25

## 2024-09-25 RX ORDER — TERBUTALINE SULFATE 1 MG/ML
0.25 INJECTION, SOLUTION SUBCUTANEOUS
Status: DISCONTINUED | OUTPATIENT
Start: 2024-09-25 | End: 2024-09-25

## 2024-09-25 RX ORDER — DIPHENHYDRAMINE HCL 25 MG
25 CAPSULE ORAL EVERY 6 HOURS PRN
Status: DISCONTINUED | OUTPATIENT
Start: 2024-09-25 | End: 2024-09-25

## 2024-09-25 RX ORDER — NALOXONE HYDROCHLORIDE 0.4 MG/ML
INJECTION, SOLUTION INTRAMUSCULAR; INTRAVENOUS; SUBCUTANEOUS PRN
Status: DISCONTINUED | OUTPATIENT
Start: 2024-09-25 | End: 2024-09-25

## 2024-09-25 RX ORDER — ACETAMINOPHEN 500 MG
1000 TABLET ORAL EVERY 8 HOURS SCHEDULED
Status: DISCONTINUED | OUTPATIENT
Start: 2024-09-25 | End: 2024-09-27 | Stop reason: HOSPADM

## 2024-09-25 RX ORDER — IBUPROFEN 400 MG/1
800 TABLET, FILM COATED ORAL EVERY 8 HOURS
Status: DISCONTINUED | OUTPATIENT
Start: 2024-09-25 | End: 2024-09-27 | Stop reason: HOSPADM

## 2024-09-25 RX ORDER — DIPHENHYDRAMINE HYDROCHLORIDE 50 MG/ML
25 INJECTION INTRAMUSCULAR; INTRAVENOUS EVERY 6 HOURS PRN
Status: DISCONTINUED | OUTPATIENT
Start: 2024-09-25 | End: 2024-09-25

## 2024-09-25 RX ORDER — FENTANYL/BUPIVACAINE/NS/PF 2-1250MCG
10 PLASTIC BAG, INJECTION (ML) INJECTION CONTINUOUS
Status: DISCONTINUED | OUTPATIENT
Start: 2024-09-25 | End: 2024-09-25

## 2024-09-25 RX ORDER — ONDANSETRON 4 MG/1
4 TABLET, ORALLY DISINTEGRATING ORAL EVERY 6 HOURS PRN
Status: DISCONTINUED | OUTPATIENT
Start: 2024-09-25 | End: 2024-09-25 | Stop reason: SDUPTHER

## 2024-09-25 RX ORDER — SODIUM CHLORIDE, SODIUM LACTATE, POTASSIUM CHLORIDE, CALCIUM CHLORIDE 600; 310; 30; 20 MG/100ML; MG/100ML; MG/100ML; MG/100ML
INJECTION, SOLUTION INTRAVENOUS CONTINUOUS
Status: DISCONTINUED | OUTPATIENT
Start: 2024-09-25 | End: 2024-09-25

## 2024-09-25 RX ORDER — SODIUM CHLORIDE 0.9 % (FLUSH) 0.9 %
5-40 SYRINGE (ML) INJECTION EVERY 12 HOURS SCHEDULED
Status: DISCONTINUED | OUTPATIENT
Start: 2024-09-25 | End: 2024-09-25

## 2024-09-25 RX ORDER — BUPIVACAINE HYDROCHLORIDE 5 MG/ML
INJECTION, SOLUTION EPIDURAL; INTRACAUDAL
Status: DISCONTINUED | OUTPATIENT
Start: 2024-09-25 | End: 2024-09-25 | Stop reason: SDUPTHER

## 2024-09-25 RX ORDER — SODIUM CHLORIDE 9 MG/ML
25 INJECTION, SOLUTION INTRAVENOUS PRN
Status: DISCONTINUED | OUTPATIENT
Start: 2024-09-25 | End: 2024-09-25

## 2024-09-25 RX ORDER — ONDANSETRON 2 MG/ML
4 INJECTION INTRAMUSCULAR; INTRAVENOUS EVERY 6 HOURS PRN
Status: DISCONTINUED | OUTPATIENT
Start: 2024-09-25 | End: 2024-09-27 | Stop reason: HOSPADM

## 2024-09-25 RX ORDER — ONDANSETRON 4 MG/1
4 TABLET, ORALLY DISINTEGRATING ORAL EVERY 6 HOURS PRN
Status: DISCONTINUED | OUTPATIENT
Start: 2024-09-25 | End: 2024-09-27 | Stop reason: HOSPADM

## 2024-09-25 RX ORDER — NALOXONE HYDROCHLORIDE 0.4 MG/ML
INJECTION, SOLUTION INTRAMUSCULAR; INTRAVENOUS; SUBCUTANEOUS PRN
Status: DISCONTINUED | OUTPATIENT
Start: 2024-09-25 | End: 2024-09-25 | Stop reason: SDUPTHER

## 2024-09-25 RX ORDER — HYDROMORPHONE HYDROCHLORIDE 1 MG/ML
1 INJECTION, SOLUTION INTRAMUSCULAR; INTRAVENOUS; SUBCUTANEOUS EVERY 4 HOURS PRN
Status: DISCONTINUED | OUTPATIENT
Start: 2024-09-25 | End: 2024-09-25

## 2024-09-25 RX ORDER — ACETAMINOPHEN 325 MG/1
650 TABLET ORAL EVERY 4 HOURS PRN
Status: DISCONTINUED | OUTPATIENT
Start: 2024-09-25 | End: 2024-09-25

## 2024-09-25 RX ORDER — DOCUSATE SODIUM 100 MG/1
100 CAPSULE, LIQUID FILLED ORAL 2 TIMES DAILY PRN
Status: DISCONTINUED | OUTPATIENT
Start: 2024-09-25 | End: 2024-09-27 | Stop reason: HOSPADM

## 2024-09-25 RX ORDER — DIPHENHYDRAMINE HYDROCHLORIDE 50 MG/ML
25 INJECTION INTRAMUSCULAR; INTRAVENOUS EVERY 6 HOURS PRN
Status: DISCONTINUED | OUTPATIENT
Start: 2024-09-25 | End: 2024-09-25 | Stop reason: SDUPTHER

## 2024-09-25 RX ORDER — MODIFIED LANOLIN
OINTMENT (GRAM) TOPICAL PRN
Status: DISCONTINUED | OUTPATIENT
Start: 2024-09-25 | End: 2024-09-27 | Stop reason: HOSPADM

## 2024-09-25 RX ORDER — EPHEDRINE SULFATE 50 MG/ML
10 INJECTION INTRAVENOUS
Status: DISCONTINUED | OUTPATIENT
Start: 2024-09-25 | End: 2024-09-25 | Stop reason: SDUPTHER

## 2024-09-25 RX ADMIN — Medication 10 ML/HR: at 14:05

## 2024-09-25 RX ADMIN — DOCUSATE SODIUM 100 MG: 100 CAPSULE, LIQUID FILLED ORAL at 20:31

## 2024-09-25 RX ADMIN — SODIUM CHLORIDE, PRESERVATIVE FREE 10 ML: 5 INJECTION INTRAVENOUS at 12:57

## 2024-09-25 RX ADMIN — SODIUM CHLORIDE, POTASSIUM CHLORIDE, SODIUM LACTATE AND CALCIUM CHLORIDE: 600; 310; 30; 20 INJECTION, SOLUTION INTRAVENOUS at 12:57

## 2024-09-25 RX ADMIN — Medication 166.7 ML: at 16:34

## 2024-09-25 RX ADMIN — BUPIVACAINE HYDROCHLORIDE 6 ML: 5 INJECTION, SOLUTION EPIDURAL; INTRACAUDAL; PERINEURAL at 13:39

## 2024-09-25 RX ADMIN — SODIUM CHLORIDE, POTASSIUM CHLORIDE, SODIUM LACTATE AND CALCIUM CHLORIDE: 600; 310; 30; 20 INJECTION, SOLUTION INTRAVENOUS at 16:08

## 2024-09-25 RX ADMIN — ACETAMINOPHEN 1000 MG: 500 TABLET ORAL at 20:31

## 2024-09-25 RX ADMIN — FENTANYL CITRATE 100 MCG: 50 INJECTION, SOLUTION INTRAMUSCULAR; INTRAVENOUS at 13:39

## 2024-09-25 NOTE — L&D DELIVERY NOTE
SONIDO Neely [[id number]]      Labor Events     Labor: No   Steroids: None  Cervical Ripening Date/Time:        Rupture Date/Time:  24 12:49:00   Rupture Type: AROM, Intact  Fluid Color: Clear, Bloody Show  Fluid Odor: None  Fluid Volume: Scant  Induction: None  Augmentation: AROM, Membrane stripping       Anesthesia    Method: Epidural       Labor Event Times      Labor onset date/time:  24 02:00:00 EDT     Dilation complete date/time:  24 15:44:00 EDT     Start pushing date/time:  2024 15:55:00   Decision date/time (emergent ):            Delivery Details      Delivery Date: 24 Delivery Time: 16:26:00   Delivery Type: , Spontaneous              [place] Presentation    Presentation: Vertex  Position: Middle  _: Occiput  _: Anterior       Shoulder Dystocia    Shoulder Dystocia Present?: No       Assisted Delivery Details    Forceps Attempted?: No  Vacuum Extractor Attempted?: No                           Cord    Complications: None  Delayed Cord Clamping?: Yes  Cord Clamped Date/Time: 2024 16:31:00  Cord Blood Disposition: Lab  Gases Sent?: No              Placenta    Date/Time: 2024 16:34:31  Removal: Expressed  Appearance: Intact  Disposition: Discarded       Lacerations           Vaginal Counts    Initial Count Personnel: ROB ANDERS  Initial Count Verified By: LISETH RAJAN RN  Intial Sponge Count: Correct Intial Needles Count: Correct Intial Instruments Count: Correct          Blood Loss  Mother: Jayla Neely #[id number]     Start of Mother's Information      Delivery Blood Loss   Intrapartum & Postpartum: 24 0200 - 24 1657    Delivery Admission: 24 020 - 24 165         Intrapartum & Postpartum Delivery Admission    None                  End of Mother's Information  Mother: Jayla Neely #[id number]                Delivery Providers    Delivering clinician: Leah Brown APRN - EMERY     Provider Role    Ata

## 2024-09-25 NOTE — H&P
History & Physical    Name: Jayla Neely MRN: [id number]  Room: [address] [place] [address]    YOB: 1995  Age: 29 y.o.  Sex: female        Subjective:     Estimated Date of Delivery: 24  OB History          2    Para   1    Term           1    AB        Living   2         SAB        IAB        Ectopic        Molar        Multiple        Live Births   1                Ms. Neely is admitted with pregnancy at 40w6d for Increasingly painful contractions. Prenatal course complicated by history of c section .   IOL requested -hx c section for twin 2 yr ago.   Cards referral-pt desires opinion about her needing an epidural and heart rate parameters for her while in labor.     PT referral for urinary incontinence --  Saw 6 times and was very helpful     Preferred name:  Jayla  Partner name:  Tio     Provider: EMERY JACKSON [id number] (twins)  EDC by Lmp confirmed with 8 week US  Pertinents:  Previous c/s for twins (Boys)  Raymond fortune--> no issues, no Cards here  Hx of GDM  Desires TOLAC  5.   Rh Neg  COVID + in pregnancy:  Covid vaccinated: X 4  Baby ASA:     Teaching checklist  First Tri handout: bkr  Second Tri Handout:3/12 JRR  Third Tri handout: JRR     IOB labs: urine culture no growth, GC/CT neg, trich neg, O neg, ABS Neg, 13.5/37.4 plt 252k, Tpal NR, VZV and Rub Imm, Hep B&C both Neg/NR, HIV NR Hgb A  Panorama: yes.  Wants gender reveal in envelope (Low Risk) Will add gender after pt picks up envelope.  BOY  AFP/MSAFP:  Third trimester labs: 13.7/39.7, hiv nr, O-, antibody neg, tpal nr  GTT: 131  Rhogam: Oneg _____7/15/24   GBS: Neg     Anatomy: Normal male anatomy  MFM Consult:      Flu: yes  TDAP: 7/15/24     Pain mgmt. 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  Please see prenatal records for details.    Past Medical History:   Diagnosis Date    Depression     Ill-defined condition     IBS    Suicidal thoughts      Past Surgical History:   Procedure Laterality Date    CARDIAC CATHETERIZATION N/A 2010    Catheter ablation    SD UNLISTED PROCEDURE CARDIAC SURGERY      heart surgery for WPW     Social History     Occupational History    Not on file   Tobacco Use    Smoking status: Never    Smokeless tobacco: Never   Substance and Sexual Activity    Alcohol use: Yes     Alcohol/week: 1.0 standard drink of alcohol    Drug use: No    Sexual activity: Not on file     Family History   Problem Relation Age of Onset    Diabetes Father     Hypertension Mother     Cancer Maternal Aunt         breast cancer       No Known Allergies  Prior to Admission medications    Medication Sig Start Date End Date Taking? Authorizing Provider   Prenatal Vit-Fe Fumarate-FA (PRENATAL PO) Take by mouth    Provider, MD Rubi        Review of Systems: A comprehensive review of systems was negative.      Objective:     Vitals:  /81   Pulse 91   Temp 98 °F (36.7 °C) (Oral)   Resp 14   LMP 12/14/2023 (Exact Date)   SpO2 98%      Physical Exam:  General NAD  CVS: RRR no r/mg  Pulmonary: CTAB  Abdm: gravid NT  Back: PAUL CVA NT, spine NT  Extremities: trace to 1+ bilateral pedal edema    Cervical Exam:  Dil/Eff/Sta  Dilation (cm): 5  Effacement: 80  Station: 0    Fetal Presentation: Vertex  Membranes:  Intact   EFW: 7 lbs         Fetal Heart Rate:   Reactive    [place]:   every 7-10 min       Prenatal Labs:   O NEGATIVE          Assessment/Plan:     Plan:   Admit for Labor  Progressing normally.    Group B Strep was negative.  IV hydration, T&S, CBC  Continuous Fetal/ [place] monitoring.   Regular meal while in latent labor and not on Pitocin. Clear liquid diet once started on Pitocin or in active labor.   Reviewed risks/benefits of all induction methods

## 2024-09-25 NOTE — PROGRESS NOTES
0600: patient arrived to [facility] reporting contractions, light bleeding (bright red blood), and decreased fetal movement. Baby began moving while placing patient on monitors. Previous C/S, desires TOLAC    0613: Dr Silva aware of patient    0630: Dr Silva at bedside to assess patient. Discussed options, planning to admit patient as she is already scheduled for induction tomorrow.      0715: Ina DELGADO CNM at bedside    0730: SVE by EMERY Buckley. Patient consented to membrane sweep.5/80/0. plan to observe for 1-2 hr, will start pitocin afterwards if needed.    0740: Bedside and Verbal shift change report given to Justina Berumen RN (oncoming nurse) by DANNY Mullen (offgoing nurse). Report included the following information Nurse Handoff Report, Intake/Output, MAR, and Recent Results.

## 2024-09-25 NOTE — PROGRESS NOTES
@ 0730 Bedside and Verbal shift change report given to LISETH Berumen RN (oncoming nurse) by JEAN CARLOS Mullen RN (offgoing nurse). Report included the following information Nurse Handoff Report, Adult Overview, Intake/Output, MAR, Recent Results, Med Rec Status, and Event Log.     @ 0830 patient ambulating in room and on birthing ball     @ 1249 MICHAELA [place] CNM performing SVE (/-1); AROM (scant amount of clear/bloody show fluids)    @ 2021-0528 anesthesia at bedside placing epidural     @ 1341 patient lying on left side     @ 1401 patient turned on right side with peanut ball     @ 1510 patient turned on left side with peanut ball     @ 1544 RN at bedside performing SVE (10/100/+2)    @ 1555 pushing began; RN at bedside, continuously monitoring FHR tracing    @ 1626 mumtaz Neely delivered via ; APGARS 9, 9     @ 1645 st cath 300 mL    @ 1700 patient resting comfortably and appropriately bonding with baby     @ 1845 TRANSFER - OUT REPORT:    Verbal report given to BETHANY Garrison on Jayla Neely being transferred to [facility] for routine progression of patient care       Report consisted of patient’s Situation, Background, Assessment and Recommendations (SBAR).     Information from the following report(s) SBAR, Intake/Output, MAR, and Recent Results was reviewed with the receiving nurse.    Opportunity for questions and clarification was provided.

## 2024-09-25 NOTE — PROGRESS NOTES
Late entry to 1250  Labor Progress Note  Patient seen, fetal heart rate and contraction pattern evaluated, patient examined.  /70   Pulse 80   Temp 98 °F (36.7 °C) (Oral)   Resp 16   LMP 2023 (Exact Date)   SpO2 99%     Physical Exam:  Cervical Exam:  7 cm dilated    90% effaced    -1 station    Presenting Part: cephalic  Membranes:  Intact  Uterine Activity: Frequency: Every 2-6 minutes, Duration: 60 seconds, and Intensity: moderate  Fetal Heart Rate: Baseline: 150 per minute  Variability: moderate  Accelerations: yes  Decelerations: none    Assessment/Plan:  Reassuring fetal status  Risks vs benefits of AROM reviewed.  Verbal consent, then AROM performed for small amount of blood stained fluid.  Epidural per pt request.  Anticipate

## 2024-09-25 NOTE — ED NOTES
28 yo  (twins) @ 40w6d who presents with decreased fetal movement and vaginal bleeding. She did have her membranes stripped yesterday but she's having a little more bleeding than expected. Movement has increased since she got here.    IOL requested -hx c section for twin 2 yr ago.   Cards referral-pt desires opinion about her needing an epidural and heart rate parameters for her while in labor.    PT referral for urinary incontinence --  Saw 6 times and was very helpful    Preferred name:  Jayla  Partner name:  Tio    Provider: EMERY JACKSON [id number] (twins)  EDC by Lmp confirmed with 8 week US  Pertinents:  1. Previous c/s for twins (Boys)  2. Andrade parkinson white--> no issues, no Cards here  3. Hx of GDM  4. Desires TOLAC  5.   Rh Neg  COVID + in pregnancy:  Covid vaccinated: X 4  Baby ASA:    Teaching checklist  First Tri handout: bkr  Second Tri Handout:3/12 JRR  Third Tri handout: JRR    IOB labs: urine culture no growth, GC/CT neg, trich neg, O neg, ABS Neg, 13.5/37.4 plt 252k, Tpal NR, VZV and Rub Imm, Hep B&C both Neg/NR, HIV NR Hgb A  Panorama: yes.  Wants gender reveal in envelope (Low Risk) Will add gender after pt picks up envelope.  BOY  AFP/MSAFP:  Third trimester labs: 13.7/39.7, hiv nr, O-, antibody neg, tpal nr  GTT: 131  Rhogam: Oneg _____7/15/24   GBS: Neg    Anatomy: Normal male anatomy  MFM Consult:     Flu: yes  TDAP: 7/15/24    Pain mgmt. 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    The history is provided by the patient.        No chief complaint on file.      Past Medical History:   Diagnosis Date    Depression     Ill-defined condition     IBS    Suicidal thoughts        Past Surgical History:   Procedure Laterality

## 2024-09-26 PROCEDURE — 85461 HEMOGLOBIN FETAL: CPT

## 2024-09-26 PROCEDURE — 36415 COLL VENOUS BLD VENIPUNCTURE: CPT

## 2024-09-26 PROCEDURE — 6360000002 HC RX W HCPCS: Performed by: MIDWIFE

## 2024-09-26 PROCEDURE — 1120000000 HC RM PRIVATE OB

## 2024-09-26 PROCEDURE — APPNB15 APP NON BILLABLE TIME 0-15 MINS: Performed by: MIDWIFE

## 2024-09-26 PROCEDURE — 6370000000 HC RX 637 (ALT 250 FOR IP): Performed by: MIDWIFE

## 2024-09-26 PROCEDURE — 86901 BLOOD TYPING SEROLOGIC RH(D): CPT

## 2024-09-26 PROCEDURE — 86900 BLOOD TYPING SEROLOGIC ABO: CPT

## 2024-09-26 PROCEDURE — 96372 THER/PROPH/DIAG INJ SC/IM: CPT

## 2024-09-26 RX ORDER — MODIFIED LANOLIN
1 OINTMENT (GRAM) TOPICAL PRN
Qty: 1 EACH | Refills: 0 | Status: SHIPPED
Start: 2024-09-26

## 2024-09-26 RX ORDER — IBUPROFEN 800 MG/1
800 TABLET, FILM COATED ORAL EVERY 8 HOURS
Qty: 120 TABLET | Refills: 3 | Status: SHIPPED | OUTPATIENT
Start: 2024-09-27

## 2024-09-26 RX ADMIN — IBUPROFEN 800 MG: 400 TABLET, FILM COATED ORAL at 00:44

## 2024-09-26 RX ADMIN — IBUPROFEN 800 MG: 400 TABLET, FILM COATED ORAL at 18:35

## 2024-09-26 RX ADMIN — IBUPROFEN 800 MG: 400 TABLET, FILM COATED ORAL at 09:32

## 2024-09-26 RX ADMIN — DOCUSATE SODIUM 100 MG: 100 CAPSULE, LIQUID FILLED ORAL at 20:51

## 2024-09-26 RX ADMIN — ACETAMINOPHEN 1000 MG: 500 TABLET ORAL at 04:26

## 2024-09-26 RX ADMIN — HUMAN RHO(D) IMMUNE GLOBULIN 300 MCG: 300 INJECTION, SOLUTION INTRAMUSCULAR at 18:39

## 2024-09-26 RX ADMIN — ACETAMINOPHEN 1000 MG: 500 TABLET ORAL at 20:51

## 2024-09-26 ASSESSMENT — PAIN - FUNCTIONAL ASSESSMENT
PAIN_FUNCTIONAL_ASSESSMENT: ACTIVITIES ARE NOT PREVENTED

## 2024-09-26 ASSESSMENT — PAIN SCALES - GENERAL
PAINLEVEL_OUTOF10: 3
PAINLEVEL_OUTOF10: 1
PAINLEVEL_OUTOF10: 3
PAINLEVEL_OUTOF10: 3

## 2024-09-26 ASSESSMENT — PAIN DESCRIPTION - ORIENTATION
ORIENTATION: LOWER
ORIENTATION: LOWER

## 2024-09-26 ASSESSMENT — PAIN DESCRIPTION - LOCATION
LOCATION: ABDOMEN
LOCATION: ABDOMEN

## 2024-09-26 ASSESSMENT — PAIN DESCRIPTION - DESCRIPTORS
DESCRIPTORS: CRAMPING
DESCRIPTORS: CRAMPING

## 2024-09-26 NOTE — DISCHARGE SUMMARY
Discharge Summary    Date: 2024  Patient Name: Jayla Neely    YOB: 1995     Age: 29 y.o.    Admit Date: 2024  Discharge Date: 2024  Discharge Condition: Good    Admission Diagnosis  40 weeks gestation of pregnancy [Z3A.40];Normal labor [O80, Z37.9]      Discharge Diagnosis  Principal Problem:    40 weeks gestation of pregnancy  Active Problems:    Normal labor  Resolved Problems:    * No resolved hospital problems. *      Hospital Stay  Narrative of Hospital Course:   of LMI    Consultants:  IP CONSULT TO LACTATION    Surgeries/procedures Performed:      Treatments:    Analgesia    Acetaminophen    Discharge Plan/Disposition:  Home    Hospital/Incidental Findings Requiring Follow Up:    Patient Instructions:    Diet: Regular Diet    Activity:Activity as Tolerated, No Sex for and No Heavy Lifting  For number of days (if applicable): 6      Other Instructions: Follow up with CNM in 6 weeks    Provider Follow-Up:   No follow-ups on file.     Significant Diagnostic Studies:    Recent Labs:  Admission on 2024  RPR                                           Date: 2024  Value: NONREACTIVE Ref range: NR                 Status: Final  WBC                                           Date: 2024  Value: 12.9 (H)    Ref range: 3.6 - 11.0 K/uL    Status: Final  RBC                                           Date: 2024  Value: 4.18        Ref range: 3.80 - 5.20 M/uL   Status: Final  Hemoglobin                                    Date: 2024  Value: 13.5        Ref range: 11.5 - 16.0 g/dL   Status: Final  Hematocrit                                    Date: 2024  Value: 37.1        Ref range: 35.0 - 47.0 %      Status: Final  MCV                                           Date: 2024  Value: 88.8        Ref range: 80.0 - 99.0 FL     Status: Final  MCH                                           Date: 2024  Value: 32.3        Ref range: 26.0 - 34.0 PG      medications, discharge plan, and follow up.    Electronically signed by DEENA Mckinney CNM on 9/26/24 at 6:50 PM EDT

## 2024-09-27 VITALS
SYSTOLIC BLOOD PRESSURE: 130 MMHG | DIASTOLIC BLOOD PRESSURE: 79 MMHG | HEART RATE: 87 BPM | RESPIRATION RATE: 16 BRPM | OXYGEN SATURATION: 97 % | TEMPERATURE: 98.2 F

## 2024-09-27 LAB
ABO + RH BLD: NORMAL
BLD PROD TYP BPU: NORMAL
BLOOD BANK BLOOD PRODUCT EXPIRATION DATE: NORMAL
BLOOD BANK DISPENSE STATUS: NORMAL
BPU ID: NORMAL
FETAL SCREEN: NORMAL
UNIT DIVISION: 0
UNIT ISSUE DATE/TIME: NORMAL

## 2024-09-27 PROCEDURE — 6370000000 HC RX 637 (ALT 250 FOR IP): Performed by: MIDWIFE

## 2024-09-27 PROCEDURE — APPNB30 APP NON BILLABLE TIME 0-30 MINS: Performed by: MIDWIFE

## 2024-09-27 RX ADMIN — IBUPROFEN 800 MG: 400 TABLET, FILM COATED ORAL at 02:23

## 2024-09-27 RX ADMIN — ACETAMINOPHEN 1000 MG: 500 TABLET ORAL at 04:52

## 2024-09-27 ASSESSMENT — PAIN SCALES - GENERAL
PAINLEVEL_OUTOF10: 2
PAINLEVEL_OUTOF10: 2

## 2024-09-27 ASSESSMENT — PAIN DESCRIPTION - LOCATION
LOCATION: ABDOMEN
LOCATION: ABDOMEN

## 2024-09-27 NOTE — DISCHARGE INSTRUCTIONS
Postpartum Support Groups  We know that all of us are dealing with a tremendous amount of uncertainty, confusion and disruption to our daily lives, which may result in increased anxiety, depression and fear. If you are feeling unsettled or worse, please know that we are here to help. During this time of increased caution and care for one another, [facility] [place] ([facility]) is offering virtual support groups to ALL MOTHERS in [place] regardless of the age of your child/children as a way to help weather this emotional storm together. Social support is an important part of self-care during this time of physical distancing.  Virtual postpartum support group meetings available at www.postpartumva.org  Warm Line: 213.906.4025    Breastfeeding Support Groups   1st and 3rd Wednesday of each month at [place]  2nd and 4th Tuesday of each month at [place]      [email or web address]

## 2024-09-27 NOTE — LACTATION NOTE
This note was copied from a baby's chart.  . Mother states that she nursed and formula fed her twin boys for 6 months. Mother reports that she did notice breast changes during this pregnancy.  Mother states that baby has been nursing well. Educated mother about feeding cues, cluster feeding, feeding on demand, offering both breasts at every feeding, and feeding at least every 3 hours.    Feeding Plan:  Mother will keep baby skin to skin as often as possible, feed on demand, 8-12x/day , respond to feeding cues, obtain latch, listen for audible swallowing, be aware of signs of oxytocin release/ cramping,thirst,sleepiness while breastfeeding, offer both breasts,and will not limit feedings.  Mother agrees to utilize breast massage while nursing to facilitate lactogenesis.

## 2024-09-27 NOTE — PROGRESS NOTES
Post-Partum Day Number 2 Progress/Discharge Note    Patient doing well post-partum without significant complaint.  She is voiding without difficulty, she reports normal lochia. She is ambulatiing without dizziness.  Her pain is well controlled with oral pain medication. She is tolerating general diet.    Vitals:  Patient Vitals for the past 8 hrs:   BP Temp Temp src Pulse Resp SpO2   24 0452 125/89 98.2 °F (36.8 °C) Oral 72 16 96 %     Temp (24hrs), Av °F (36.7 °C), Min:97.6 °F (36.4 °C), Max:98.3 °F (36.8 °C)        Exam:  Patient without distress.                             Abdomen soft, fundus firm at level of umbilicus, nontender               Lower extremities are negative for cords or tenderness; no swelling.    Labs: No results found for this or any previous visit (from the past 24 hour(s)).    No components found for: \"OBEXTABORH\", \"OBEXTABSCRN\", \"OBEXTRUBELLA\", \"OBEXTGRBS\", \"OBEXTHBSAG\", \"OBEXTHIV\", \"OBEXTRPR\", \"OBEXTGONORR\", \"OBEXTCHLAM\"    Assessment and Plan:    Postpartum Day #2, S/P .  Doing well.   - d/c home   - F/U 6wk postpartum check, sooner prn

## 2024-10-09 ENCOUNTER — TELEPHONE (OUTPATIENT)
Facility: HOSPITAL | Age: 29
End: 2024-10-09

## 2024-10-09 NOTE — TELEPHONE ENCOUNTER
Patient called after-hours service after having a clot about golf ball size.  Patient reports only having 1.  She is now about 2 weeks postpartum.  We discussed to apply a pad and continue to watch.  Call back if pad fills up in an hour or if she continues to pass large golf ball size clots.  Called patient back several hours later patient reports that the bleeding has improved.  However still heavy like a period and will call back when the office opens to request sooner appointment.  Ina Basurto, DEENA - EMERY

## 2024-11-07 PROBLEM — Z37.9 NORMAL LABOR: Status: RESOLVED | Noted: 2024-09-25 | Resolved: 2024-11-07

## 2024-11-07 PROBLEM — Z3A.40 40 WEEKS GESTATION OF PREGNANCY: Status: RESOLVED | Noted: 2024-09-25 | Resolved: 2024-11-07

## 2024-11-08 ENCOUNTER — POSTPARTUM VISIT (OUTPATIENT)
Age: 29
End: 2024-11-08

## 2024-11-08 VITALS
HEART RATE: 83 BPM | HEIGHT: 64 IN | TEMPERATURE: 98.4 F | RESPIRATION RATE: 16 BRPM | SYSTOLIC BLOOD PRESSURE: 122 MMHG | OXYGEN SATURATION: 97 % | BODY MASS INDEX: 31.07 KG/M2 | WEIGHT: 182 LBS | DIASTOLIC BLOOD PRESSURE: 80 MMHG

## 2024-11-08 DIAGNOSIS — Z30.09 GENERAL COUNSELING AND ADVICE FOR CONTRACEPTIVE MANAGEMENT: ICD-10-CM

## 2024-11-08 PROBLEM — Z3A.08 8 WEEKS GESTATION OF PREGNANCY: Status: RESOLVED | Noted: 2024-02-13 | Resolved: 2024-11-08

## 2024-11-08 NOTE — PROGRESS NOTES
Jayla Neely is a 29 y.o. female returns for a routine post-partum follow-up visit     No chief complaint on file.      Postpartum Depression: Low Risk  (2024)    [place]  Depression Scale     Last EPDS Total Score: 0     Last EPDS Self Harm Result: Never         Type of delivery: normal spontaneous vaginal delivery  Date of Delivery: 2024  Breastfeeding: yes  Bleeding Resolved: yes  Birth Control: none.  Last Pap: normal obtained 2 year(s) ago.        Problems: problems - None    1. Have you been to the ER, urgent care clinic, or hospitalized since your last visit? [facility]    2. Have you seen or consulted any other health care providers outside of the [facility] since your last visit? No    Examination chaperoned by Franco Gilmore LPN.

## 2025-07-01 LAB
CHOLEST SERPL-MCNC: 146 MG/DL
GLUCOSE SERPL-MCNC: 96 MG/DL (ref 65–100)
HDLC SERPL-MCNC: 68 MG/DL
LDLC SERPL CALC-MCNC: 68.8 MG/DL (ref 0–100)
TRIGL SERPL-MCNC: 46 MG/DL

## 2025-07-28 NOTE — PROGRESS NOTES
Post-Partum Day Number 1 Progress Note      Patient doing well post-partum without significant complaint.  She is voiding without difficulty, she reports normal lochia. She is ambulatiing without dizziness.  Her pain is well controlled with oral pain medication. She is tolerating general diet.      Vitals:  Patient Vitals for the past 8 hrs:   BP Temp Temp src Pulse Resp SpO2   24 1730 138/73 98.3 °F (36.8 °C) Oral (!) 113 16 98 %     Temp (24hrs), Av.6 °F (37 °C), Min:98 °F (36.7 °C), Max:99.5 °F (37.5 °C)        Exam:  Patient without distress.                          Abdomen soft, nontender, nondistended, normal bowel sounds               Uterus: fundus firm at level of umbilicus, nontender               Lower extremities are negative for cords or tenderness, no swelling.    Labs:   Recent Results (from the past 24 hour(s))   RHOGAM PRENATAL EVALUATION    Collection Time: 24  4:40 AM   Result Value Ref Range    ABO/Rh O NEGATIVE     Fetal Screen NEG     Unit Number [id number]     Product Code Blood Bank RH IMMUNE GLOBULIN     Unit Divison 00     Dispense Status Blood Bank ISSUED     Unit Issue Date/Time [id number]     Blood Bank Blood Product Expiration Date 162490267966        No components found for: \"OBEXTABORH\", \"OBEXTABSCRN\", \"OBEXTRUBELLA\", \"OBEXTGRBS\", \"OBEXTHBSAG\", \"OBEXTHIV\", \"OBEXTRPR\", \"OBEXTGONORR\", \"OBEXTCHLAM\"    Assessment and Plan:   Postpartum Day #1 S/P .  Doing well.   - routine care   - anticipate discharge in AM                   Sent in for patient  Zaida Landeros, ESAU CNP